# Patient Record
Sex: MALE | Race: WHITE | NOT HISPANIC OR LATINO | Employment: FULL TIME | URBAN - METROPOLITAN AREA
[De-identification: names, ages, dates, MRNs, and addresses within clinical notes are randomized per-mention and may not be internally consistent; named-entity substitution may affect disease eponyms.]

---

## 2017-01-22 ENCOUNTER — HOSPITAL ENCOUNTER (EMERGENCY)
Facility: CLINIC | Age: 57
Discharge: HOME | End: 2017-01-22
Attending: PHYSICIAN ASSISTANT | Admitting: PHYSICIAN ASSISTANT
Payer: COMMERCIAL

## 2017-01-22 ENCOUNTER — HOSPITAL ENCOUNTER (EMERGENCY)
Facility: CLINIC | Age: 57
Discharge: HOME | End: 2017-01-22
Attending: EMERGENCY MEDICINE | Admitting: EMERGENCY MEDICINE
Payer: COMMERCIAL

## 2017-01-22 VITALS
BODY MASS INDEX: 32.14 KG/M2 | TEMPERATURE: 97.9 F | DIASTOLIC BLOOD PRESSURE: 125 MMHG | HEIGHT: 66 IN | SYSTOLIC BLOOD PRESSURE: 197 MMHG | OXYGEN SATURATION: 99 % | WEIGHT: 200 LBS | RESPIRATION RATE: 20 BRPM

## 2017-01-22 VITALS
HEART RATE: 107 BPM | DIASTOLIC BLOOD PRESSURE: 99 MMHG | SYSTOLIC BLOOD PRESSURE: 153 MMHG | RESPIRATION RATE: 18 BRPM | HEIGHT: 66 IN | WEIGHT: 200 LBS | BODY MASS INDEX: 32.14 KG/M2 | TEMPERATURE: 97.5 F | OXYGEN SATURATION: 97 %

## 2017-01-22 DIAGNOSIS — K08.89 PAIN, DENTAL: ICD-10-CM

## 2017-01-22 PROCEDURE — 64400 NJX AA&/STRD TRIGEMINAL NRV: CPT

## 2017-01-22 PROCEDURE — 99283 EMERGENCY DEPT VISIT LOW MDM: CPT | Mod: 25

## 2017-01-22 RX ORDER — AMLODIPINE BESYLATE 5 MG/1
5 TABLET ORAL DAILY
Status: DISCONTINUED | OUTPATIENT
Start: 2017-01-22 | End: 2017-01-22 | Stop reason: HOSPADM

## 2017-01-22 RX ORDER — OXYCODONE HYDROCHLORIDE 5 MG/1
10 TABLET ORAL ONCE
Status: COMPLETED | OUTPATIENT
Start: 2017-01-22 | End: 2017-01-22

## 2017-01-22 RX ORDER — AMOXICILLIN 500 MG/1
500 CAPSULE ORAL 3 TIMES DAILY
Qty: 21 CAPSULE | Refills: 0 | Status: SHIPPED | OUTPATIENT
Start: 2017-01-22 | End: 2017-01-29

## 2017-01-22 RX ORDER — TRAMADOL HYDROCHLORIDE 50 MG/1
50 TABLET ORAL EVERY 6 HOURS PRN
Qty: 20 TABLET | Refills: 0 | Status: SHIPPED | OUTPATIENT
Start: 2017-01-22 | End: 2018-01-27

## 2017-01-22 RX ORDER — NAPROXEN 500 MG/1
500 TABLET ORAL EVERY 12 HOURS PRN
Qty: 15 TABLET | Refills: 0 | Status: SHIPPED | OUTPATIENT
Start: 2017-01-22 | End: 2018-01-27

## 2017-01-22 RX ADMIN — AMLODIPINE BESYLATE 5 MG: 5 TABLET ORAL at 16:28

## 2017-01-22 RX ADMIN — OXYCODONE HYDROCHLORIDE 10 MG: 5 TABLET ORAL at 15:22

## 2017-01-22 ASSESSMENT — ENCOUNTER SYMPTOMS
FACIAL SWELLING: 1
LIGHT-HEADEDNESS: 0
NAUSEA: 0
FEVER: 0
CHILLS: 0
SORE THROAT: 1
SHORTNESS OF BREATH: 0
FEVER: 0

## 2017-01-22 NOTE — ED AVS SNAPSHOT
"  Emergency Department    [address] [facility] [address]    Phone:  [phone]    Fax:  [phone]                                       Fawad Wilkinson   MRN: [id number]    Department:   Emergency Department   Date of Visit:  1/22/2017           Patient Information     Date Of Birth          1960        Your diagnoses for this visit were:     Pain, dental        You were seen by Ephraim French MD.      Follow-up Information     Follow up with Moises Arcos MD.    Specialty:  Family Practice    Contact information:    [facility]  [address] [facility] [address]  [phone]          Follow up with Dentist ASAP.        Discharge Instructions          *DENTAL PAIN    A crack or cavity in the tooth, which exposes the sensitive inner area of the tooth can cause tooth pain. An infection in the gum or the root of the tooth can cause pain and swelling. The pain is often made worse by drinking hot or cold fluids, or biting on hard foods. Pain may spread from the tooth to the ear or jaw on the same side.  HOME CARE:  1. Avoid hot and cold foods and liquids since your tooth may be sensitive to temperature changes.  2. If your tooth is chipped or cracked, or if there is a large open cavity, apply OIL OF CLOVES (available over-the-counter in drug stores) directly to the tooth to reduce pain. Some pharmacies carry an over-the-counter \"toothache kit.\" This contains a paste, which can be applied over the exposed tooth to decrease sensitivity. This is only a temporary solution. See a dentist as soon as possible to fix the tooth.  3. A cold pack on your jaw over the sore area may help reduce pain.  4. You may use acetaminophen (Tylenol) 650-1000 mg every 6 hours or ibuprofen (Motrin, Advil) 600 mg every 6-8 hours with food to control pain, if you are able to take these medicines. [ NOTE: If you have chronic liver or kidney disease or ever had a stomach ulcer or GI " bleeding, talk with your doctor before using these medicines.]  5. If you have signs of an infection, an antibiotic will be given. Take it as directed.  FOLLOW-UP as directed with a dentist. Your pain may go away with the treatment given. However, only a dentist can fully evaluate and treat the cause and prevent the pain from coming back again.  TOOTHACHE IS A SIGN OF DISEASE IN YOUR TOOTH AND SHOULD BE EXAMINED AND TREATED BY A DENTIST.  GET PROMPT MEDICAL ATTENTION if any of the following occur:    Your face becomes swollen or red    Pain worsens or spreads to the neck    Fever over 101  F (38.3  C)    Unusual drowsiness; headache or stiff neck; weakness or fainting    Pus drains from the tooth    Difficulty swallowing or breathing       1385-3148 [facility], [address] [place] [address], [place] [address]. All rights reserved. This information is not intended as a substitute for professional medical care. Always follow your healthcare professional's instructions.      24 Hour Appointment Hotline       To make an appointment at any [facility], call [phone] ([phone]). If you don't have a family doctor or clinic, we will help you find one. [facility] clinics are conveniently located to serve the needs of you and your family.             Review of your medicines      START taking        Dose / Directions Last dose taken    amoxicillin 500 MG capsule   Commonly known as:  AMOXIL   Dose:  500 mg   Quantity:  21 capsule        Take 1 capsule (500 mg) by mouth 3 times daily for 7 days   Refills:  0        naproxen 500 MG tablet   Commonly known as:  NAPROSYN   Dose:  500 mg   Quantity:  15 tablet        Take 1 tablet (500 mg) by mouth every 12 hours as needed for moderate pain   Refills:  0        traMADol 50 MG tablet   Commonly known as:  ULTRAM   Dose:  50 mg   Quantity:  20 tablet        Take 1 tablet (50 mg) by mouth every 6 hours as needed for moderate pain   Refills:  0          Our records show  that you are taking the medicines listed below. If these are incorrect, please call your family doctor or clinic.        Dose / Directions Last dose taken    AMLODIPINE BESYLATE PO   Dose:  5 mg        Take 5 mg by mouth daily.   Refills:  0        NORTRIPTYLINE HCL PO   Dose:  50 mg        Take 50 mg by mouth daily.   Refills:  0                Prescriptions were sent or printed at these locations (3 Prescriptions)                   Other Prescriptions                Printed at Department/Unit printer (3 of 3)         naproxen (NAPROSYN) 500 MG tablet               traMADol (ULTRAM) 50 MG tablet               amoxicillin (AMOXIL) 500 MG capsule                Orders Needing Specimen Collection     None      Pending Results     No orders found from 1/21/2017 to 1/23/2017.            Pending Culture Results     No orders found from 1/21/2017 to 1/23/2017.             Test Results from your hospital stay            Clinical Quality Measure: Blood Pressure Screening     Your blood pressure was checked while you were in the emergency department today. The last reading we obtained was  BP: (!) 197/125 mmHg . Please read the guidelines below about what these numbers mean and what you should do about them.  If your systolic blood pressure (the top number) is less than 120 and your diastolic blood pressure (the bottom number) is less than 80, then your blood pressure is normal. There is nothing more that you need to do about it.  If your systolic blood pressure (the top number) is 120-139 or your diastolic blood pressure (the bottom number) is 80-89, your blood pressure may be higher than it should be. You should have your blood pressure rechecked within a year by a primary care provider.  If your systolic blood pressure (the top number) is 140 or greater or your diastolic blood pressure (the bottom number) is 90 or greater, you may have high blood pressure. High blood pressure is treatable, but if left untreated over time  "it can put you at risk for heart attack, stroke, or kidney failure. You should have your blood pressure rechecked by a primary care provider within the next 4 weeks.  If your provider in the emergency department today gave you specific instructions to follow-up with your doctor or provider even sooner than that, you should follow that instruction and not wait for up to 4 weeks for your follow-up visit.        Thank you for choosing [facility]       Thank you for choosing [facility] for your care. Our goal is always to provide you with excellent care. Hearing back from our patients is one way we can continue to improve our services. Please take a few minutes to complete the written survey that you may receive in the mail after you visit with us. Thank you!        [facility] Information     [employer] lets you send messages to your doctor, view your test results, renew your prescriptions, schedule appointments and more. To sign up, go to [email or web address] . Click on \"Log in\" on the left side of the screen, which will take you to the Welcome page. Then click on \"Sign up Now\" on the right side of the page.     You will be asked to enter the access code listed below, as well as some personal information. Please follow the directions to create your username and password.     Your access code is: [email or web address]  Expires: 2017 12:29 AM     Your access code will  in 90 days. If you need help or a new code, please call your [facility] clinic or [phone].        Care EveryWhere ID     This is your Care EveryWhere ID. This could be used by other organizations to access your [facility] medical records  [id number]        After Visit Summary       This is your record. Keep this with you and show to your community pharmacist(s) and doctor(s) at your next visit.                  "

## 2017-01-22 NOTE — DISCHARGE INSTRUCTIONS
"   *DENTAL PAIN    A crack or cavity in the tooth, which exposes the sensitive inner area of the tooth can cause tooth pain. An infection in the gum or the root of the tooth can cause pain and swelling. The pain is often made worse by drinking hot or cold fluids, or biting on hard foods. Pain may spread from the tooth to the ear or jaw on the same side.  HOME CARE:  1. Avoid hot and cold foods and liquids since your tooth may be sensitive to temperature changes.  2. If your tooth is chipped or cracked, or if there is a large open cavity, apply OIL OF CLOVES (available over-the-counter in drug stores) directly to the tooth to reduce pain. Some pharmacies carry an over-the-counter \"toothache kit.\" This contains a paste, which can be applied over the exposed tooth to decrease sensitivity. This is only a temporary solution. See a dentist as soon as possible to fix the tooth.  3. A cold pack on your jaw over the sore area may help reduce pain.  4. You may use acetaminophen (Tylenol) 650-1000 mg every 6 hours or ibuprofen (Motrin, Advil) 600 mg every 6-8 hours with food to control pain, if you are able to take these medicines. [ NOTE: If you have chronic liver or kidney disease or ever had a stomach ulcer or GI bleeding, talk with your doctor before using these medicines.]  5. If you have signs of an infection, an antibiotic will be given. Take it as directed.  FOLLOW-UP as directed with a dentist. Your pain may go away with the treatment given. However, only a dentist can fully evaluate and treat the cause and prevent the pain from coming back again.  TOOTHACHE IS A SIGN OF DISEASE IN YOUR TOOTH AND SHOULD BE EXAMINED AND TREATED BY A DENTIST.  GET PROMPT MEDICAL ATTENTION if any of the following occur:    Your face becomes swollen or red    Pain worsens or spreads to the neck    Fever over 101  F (38.3  C)    Unusual drowsiness; headache or stiff neck; weakness or fainting    Pus drains from the tooth    Difficulty " swallowing or breathing       [id number] [facility], [address] [place] [address], [place] [address]. All rights reserved. This information is not intended as a substitute for professional medical care. Always follow your healthcare professional's instructions.

## 2017-01-22 NOTE — DISCHARGE INSTRUCTIONS
Discharge Instructions  Dental Pain    You have been seen today for a toothache. Your pain may be caused by an exposed nerve, an infection (pulpitis), a root abscess, or other problems. You will need to see a dentist for a solution to your tooth problem. Emergency Department care is only to help control your problem until you can see a dentist.  Today, we did not find any sign that your toothache was caused by a serious condition, but sometimes symptoms develop over time and cannot be found during an emergency visit, so it is very important that you follow up with your dentist.      Return to the Emergency Department if:    You develop a fever over 101 degrees Fahrenheit.    You can t open your mouth normally, can t move your tongue well, or can t swallow.    You have new or increased swelling of your face or neck.    You develop drainage of pus or foul smelling material from around your tooth.  What can I do to help myself?    Take any antibiotic the doctor may have prescribed for you today.    Avoid very hot or very cold foods as both can cause pain.    Make an appointment to see a dentist as soon as possible. If you wish, we can provide you with a list of low-cost dental clinics.   If you were given a prescription for medicine here today, be sure to read all of the information (including the package insert) that comes with your prescription.  This will include important information about the medicine, its side effects, and any warnings that you need to know about.  The pharmacist who fills the prescription can provide more information and answer questions you may have about the medicine.  If you have questions or concerns that the pharmacist cannot address, please call or return to the Emergency Department.   Opioid Medication Information    Pain medications are among the most commonly prescribed medicines, so we are including this information for all our patients. If you did not receive pain medication or get  a prescription for pain medicine, you can ignore it.     You may have been given a prescription for an opioid (narcotic) pain medicine and/or have received a pain medicine while here in the Emergency Department. These medicines can make you drowsy or impaired. You must not drive, operate dangerous equipment, or engage in any other dangerous activities while taking these medications. If you drive while taking these medications, you could be arrested for DUI, or driving under the influence. Do not drink any alcohol while you are taking these medications.     Opioid pain medications can cause addiction. If you have a history of chemical dependency of any type, you are at a higher risk of becoming addicted to pain medications.  Only take these prescribed medications to treat your pain when all other options have been tried. Take it for as short a time and as few doses as possible. Store your pain pills in a secure place, as they are frequently stolen and provide a dangerous opportunity for children or visitors in your house to start abusing these powerful medications. We will not replace any lost or stolen medicine.  As soon as your pain is better, you should flush all your remaining medication.     Many prescription pain medications contain Tylenol  (acetaminophen), including Vicodin , Tylenol #3 , Norco , Lortab , and Percocet .  You should not take any extra pills of Tylenol  if you are using these prescription medications or you can get very sick.  Do not ever take more than 3000 mg of acetaminophen in any 24 hour period.    All opioids tend to cause constipation. Drink plenty of water and eat foods that have a lot of fiber, such as fruits, vegetables, prune juice, apple juice and high fiber cereal.  Take a laxative if you don t move your bowels at least every other day. Miralax , Milk of Magnesia, Colace , or Senna  can be used to keep you regular.      Remember that you can always come back to the Emergency  Department if you are not able to see your regular doctor in the amount of time listed above, if you get any new symptoms, or if there is anything that worries you.

## 2017-01-22 NOTE — ED PROVIDER NOTES
"  History     Chief Complaint:  Dental Pain    HPI   Fawad Wilkinson is a 56 year old male who presents to the emergency department today for evaluation of dental pain. The patient reports that one of his lower left teeth is cracked, and he has been experiencing worsening pain over the past day. He has a dental appointment scheduled for next week. Denies fevers, or pus or drainage.     Allergies:  The patient has no known drug allergies.     Medications:    Nortriptyline  Amlodipine     Past Medical History:    History reviewed.  No significant past medical history.    Past Surgical History:    History reviewed.  No significant past surgical history.     Family History:    History reviewed.  No significant family history.    Social History:  Tobacco use: Positive (0.5 ppd)  Alcohol use: Negative  The patient presents alone.     Review of Systems   Constitutional: Negative for fever.   HENT: Positive for dental problem.    All other systems reviewed and are negative.      Physical Exam   Vitals:  BP: 197/125 mmHg  Temp: 97.9  F (36.6  C)  Temp src: Oral  Resp: 20  SpO2: 99 %  Height: 167.6 cm (5' 6\")  Weight: 90.719 kg (200 lb)     Physical Exam  Constitutional: Well-developed and well-nourished. Alert and non-toxic appearing.  HENT: Poor dentition. Left sided broken lower first premolar, mild gingival inflammation, tender with palpation, no drainage.   Head: No evidence of craniofacial trauma.  Mouth/Throat: Oropharynx is clear and moist.  Ears: External ears normal.  Eyes:  Conjunctivae normal. EOM grossly normal. No discharge.  Neck: Normal range of motion. Neck supple.       Emergency Department Course     Emergency Department Course:  Nursing notes and vitals reviewed.  I performed an exam of the patient as documented above.  The above workup was undertaken.  1220: I performed a nerve block near the injured tooth, using 0.5% bupivacaine with epinephrine.     Findings and plan explained to the Patient. Patient " discharged home with instructions regarding supportive care, medications, and reasons to return. The importance of close follow-up was reviewed. The patient was prescribed Naprosyn, Ultram, and Amoxil.        Impression & Plan      Medical Decision Making:  Fawad Wilkinson is a 56 year old male with a one day history of worsening tooth pain on the left side. He notes that he broke the tooth some months ago. On exam, he does have gingival inflammation, but no fluctuance or drainage that would suggest chris abscess. I feel he can be safely managed as an outpatient. I performed inferior alveolar nerve block with good relief. I have prescribed naproxen, and a small prescription for tramadol. He should follow up with the dentist this week. I will place him on Amoxil for the next seven days.     Diagnosis:    ICD-10-CM   1. Pain, dental K08.89     Disposition:   Discharge to home with dental follow up.     Discharge Medications:  naproxen (NAPROSYN) 500 MG tablet Take 1 tablet (500 mg) by mouth every 12 hours as needed for moderate pain, Disp-15 tablet, R-0, Local Print   tramadol (ULTRAM) 50 MG tablet Take 1 tablet (50 mg) by mouth every 6 hours as needed for moderate pain, Disp-20 tablet, R-0, Local Print   amoxicillin (AMOXIL) 500 MG capsule Take 1 capsule (500 mg) by mouth 3 times daily for 7 days, Disp-21 capsule, R-0, Local Print     Rbo RITTER, sherif serving as a scribe on 1/22/2017 at 12:10 AM to personally document services performed by Ephraim French MD, based on my observations and the provider's statements to me.     EMERGENCY DEPARTMENT        Ephraim French MD  01/22/17 0436

## 2017-01-22 NOTE — ED AVS SNAPSHOT
Emergency Department    [address] [place] [address]    Phone:  [phone]    Fax:  [phone]                                       Fawad Wilkinson   MRN: [id number]    Department:   Emergency Department   Date of Visit:  1/22/2017           After Visit Summary Signature Page     I have received my discharge instructions, and my questions have been answered. I have discussed any challenges I see with this plan with the nurse or doctor.    ..........................................................................................................................................  Patient/Patient Representative Signature      ..........................................................................................................................................  Patient Representative Print Name and Relationship to Patient    ..................................................               ................................................  Date                                            Time    ..........................................................................................................................................  Reviewed by Signature/Title    ...................................................              ..............................................  Date                                                            Time

## 2017-01-22 NOTE — ED PROVIDER NOTES
History     Chief Complaint:  Dental Pain      HPI   Fawad Wilkinson is a 56 year old male with a history of HTN and DM who presents with dental pain.  The patient reports developing pain in his left lower jaw yesterday morning in the area where he has a broken tooth.  He was seen for this same pain earlier today and was provided a nerve block which did provide him temporary relief.  He was also provided prescriptions for Amoxicillin, Tramadol, and Naproxen.  He woke this afternoon with the same pain in his left lower jaw.  He was unable to find a dentist today but reports he does have an appointment scheduled for tomorrow.  He has taken 3 doses of each of the medications he was prescribed with no improvement in his pain.  He feels his left face is more swollen and he does endorse a slight sore throat.  He denies any fevers, chills, or drainage from the area.    Patient was noted to be hypertensive on arrival.  He did not take his Amlodipine this morning.  He denies any chest pain, shortness of breath, nausea, or lightheadedness.  He denies any significant alcohol use or illicit drug use.    Allergies:  No known drug allergies.      Medications:    Naproxen  Tramadol  Amoxicillin  Amlodipine  Nortriptyline       Past Medical History:    Hypertension  Diabetes      Past Surgical History:    History reviewed. No pertinent past surgical history.      Family History:    History reviewed. No pertinent family history.      Social History:  Marital Status: single  Presents to the ED alone.  Tobacco Use: Current daily smoker, 0.5 PPD.   Alcohol Use: No alcohol use.   Drug Use: History of methamphetamine use     Review of Systems   Constitutional: Negative for fever and chills.   HENT: Positive for dental problem, facial swelling and sore throat.    Respiratory: Negative for shortness of breath.    Cardiovascular: Negative for chest pain.   Gastrointestinal: Negative for nausea.   Neurological: Negative for  "light-headedness.   All other systems reviewed and are negative.      Physical Exam   First Vitals:  BP: (!) 150/104 mmHg  Pulse: 107  Temp: 97.5  F (36.4  C)  Resp: 18  Height: 167.6 cm (5' 6\")  Weight: 90.719 kg (200 lb)  SpO2: 100 %      Physical Exam  General: Alert, interactive, appears comfortable sitting in the bed.     Eye:  Pupils are equal, round, and reactive. Sclera are slightly injected.    ENT:     No rhinorrhea. Moist mucus membranes.  Oropharynx is clear with no exudates.   Uvula midline. No peritonsillar edema. Tooth #21 is broken and tender with some gingivitis. No palpable abscess. No sublingual edema. No mandibular, submandibular, or neck tenderness. Mild left facial swelling. There is erythema over bilateral lower cheeks which appears chronic; no increased warmth. No trismus.     Neck: No rigidity. Trachea is  Midline. No palpable mass or lymphadenopathy is detected              Cardiac:  Slightly tachycardic rate and rhythm. S1, S2.  No murmurs, gallops, or rubs.    Pulmonary:  Clear to auscultation bilaterally.  No wheezes or crackles.             Non labored. No use of accessory muscles.     Musculoskeletal:  Freely moveable extremities    Skin:  Warm and mildly diaphoretic.    Neurologic:  Non-focal exam without asymmetric weakness or numbness.  GCS 15    Psychiatric:  Awake. Appropriate interaction with examiner.      Emergency Department Course     Procedures:    Dental Block      INDICATIONS: Dental pain    ANESTHESIA: Left inferior alveolar nerve block using Bupivacaine 0.5% without Epinephrine.     PATIENT STATUS: The patient tolerated the procedure well and there were no immediate complications.        Interventions:  (1512) Oxycodone, 10 mg, PO  (1628) Amlodipine, 5 mg, PO    Emergency Department Course:  Nursing notes and vitals reviewed.  I performed an exam of the patient as documented above.     I performed a dental block as documented above.     (1545) Recheck: Pain is improving. "      (1600) Recheck: Patient feels improved.  Repeat BP: 133/96, HR 97    Findings and plan explained to the patient. Patient discharged home with instructions regarding supportive care, medications, and reasons to return. The importance of close follow-up was reviewed.     Impression & Plan      Medical Decision Making:  This is a 56 year old male with a history of hypertension and substance abuse here with complaints of dental pain.  On exam the patient is hypertensive and tachycardic.  He denies any chest pain, shortness of breath, nausea after multiple rounds of questioning throughout the emergency department.  He does report left lower molar dental pain which is tender to palpation at tooth #21.  He does have surrounding gingival inflammation and has significant dental bill.  He was seen and evaluated earlier today in the emergency department for similar presentation and prescribed Amoxicillin, Tramadol, and Naproxen.  He also received a dental block which apparently worn off and the patient represented for pain this afternoon.  He continued to deny any chest pain, shortness of breath, or other symptoms.  With the resolution of his pain with the dental block and his clinical exam, I doubt further underlying referred etiology.  There is no clinical evidence suggestive of deep space infection or osteomyelitis of the mandibular region.  There is no clinical evidence suggestive of retropharyngeal abscess or peritonsilar abscess.  Per chart review the patient has it noted that he has a history of substance abuse however he declines this at this time, stating it was many years ago.  He denies any current recreational drug use.  The prescription database monitoring shows recent prescriptions, including the one from earlier today.  He also denies alcohol use. After his pain was controlled the pts blood pressure and diaphoresis became better controlled. He did not take his BP medication this morning so we franco given him  his does of amlodipine 5mg PO.   The pt is discharged home with follow up for his dentist appointment tomorrow.     Diagnosis:    ICD-10-CM    1. Pain, dental K08.89    2. Elevated blood pressure I10        Disposition:  Discharged to home.     Discharge Medications:  None      Luli RITTER, am serving as a scribe on 1/22/2017 at 2:33 PM to personally document services performed by Herminia Cox PA-C based on my observations and the provider's statements to me.     Luli Carney  1/22/2017    EMERGENCY DEPARTMENT        Herminia Cox PA-C  01/22/17 [id number]

## 2017-01-22 NOTE — ED AVS SNAPSHOT
Emergency Department    [address] [place] [address]    Phone:  [phone]    Fax:  [phone]                                       Fawad Wilkinson   MRN: [id number]    Department:   Emergency Department   Date of Visit:  1/22/2017           Patient Information     Date Of Birth          1960        Your diagnoses for this visit were:     Pain, dental     Elevated blood pressure        You were seen by Herminia Cox PA-C.      Follow-up Information     Follow up with The dentist . Go in 1 day.    Why:  To your scheduled appointment         Discharge Instructions         Discharge Instructions  Dental Pain    You have been seen today for a toothache. Your pain may be caused by an exposed nerve, an infection (pulpitis), a root abscess, or other problems. You will need to see a dentist for a solution to your tooth problem. Emergency Department care is only to help control your problem until you can see a dentist.  Today, we did not find any sign that your toothache was caused by a serious condition, but sometimes symptoms develop over time and cannot be found during an emergency visit, so it is very important that you follow up with your dentist.      Return to the Emergency Department if:    You develop a fever over 101 degrees Fahrenheit.    You can t open your mouth normally, can t move your tongue well, or can t swallow.    You have new or increased swelling of your face or neck.    You develop drainage of pus or foul smelling material from around your tooth.  What can I do to help myself?    Take any antibiotic the doctor may have prescribed for you today.    Avoid very hot or very cold foods as both can cause pain.    Make an appointment to see a dentist as soon as possible. If you wish, we can provide you with a list of low-cost dental clinics.   If you were given a prescription for medicine here today, be sure to read all of the information (including the package  insert) that comes with your prescription.  This will include important information about the medicine, its side effects, and any warnings that you need to know about.  The pharmacist who fills the prescription can provide more information and answer questions you may have about the medicine.  If you have questions or concerns that the pharmacist cannot address, please call or return to the Emergency Department.   Opioid Medication Information    Pain medications are among the most commonly prescribed medicines, so we are including this information for all our patients. If you did not receive pain medication or get a prescription for pain medicine, you can ignore it.     You may have been given a prescription for an opioid (narcotic) pain medicine and/or have received a pain medicine while here in the Emergency Department. These medicines can make you drowsy or impaired. You must not drive, operate dangerous equipment, or engage in any other dangerous activities while taking these medications. If you drive while taking these medications, you could be arrested for DUI, or driving under the influence. Do not drink any alcohol while you are taking these medications.     Opioid pain medications can cause addiction. If you have a history of chemical dependency of any type, you are at a higher risk of becoming addicted to pain medications.  Only take these prescribed medications to treat your pain when all other options have been tried. Take it for as short a time and as few doses as possible. Store your pain pills in a secure place, as they are frequently stolen and provide a dangerous opportunity for children or visitors in your house to start abusing these powerful medications. We will not replace any lost or stolen medicine.  As soon as your pain is better, you should flush all your remaining medication.     Many prescription pain medications contain Tylenol  (acetaminophen), including Vicodin , Tylenol #3 , Norco ,  Lortab , and Percocet .  You should not take any extra pills of Tylenol  if you are using these prescription medications or you can get very sick.  Do not ever take more than 3000 mg of acetaminophen in any 24 hour period.    All opioids tend to cause constipation. Drink plenty of water and eat foods that have a lot of fiber, such as fruits, vegetables, prune juice, apple juice and high fiber cereal.  Take a laxative if you don t move your bowels at least every other day. Miralax , Milk of Magnesia, Colace , or Senna  can be used to keep you regular.      Remember that you can always come back to the Emergency Department if you are not able to see your regular doctor in the amount of time listed above, if you get any new symptoms, or if there is anything that worries you.      24 Hour Appointment Hotline       To make an appointment at any [facility], call [phone] ([phone]). If you don't have a family doctor or clinic, we will help you find one. Home clinics are conveniently located to serve the needs of you and your family.             Review of your medicines      Our records show that you are taking the medicines listed below. If these are incorrect, please call your family doctor or clinic.        Dose / Directions Last dose taken    AMLODIPINE BESYLATE PO   Dose:  5 mg        Take 5 mg by mouth daily.   Refills:  0        amoxicillin 500 MG capsule   Commonly known as:  AMOXIL   Dose:  500 mg   Quantity:  21 capsule        Take 1 capsule (500 mg) by mouth 3 times daily for 7 days   Refills:  0        naproxen 500 MG tablet   Commonly known as:  NAPROSYN   Dose:  500 mg   Quantity:  15 tablet        Take 1 tablet (500 mg) by mouth every 12 hours as needed for moderate pain   Refills:  0        NORTRIPTYLINE HCL PO   Dose:  50 mg        Take 50 mg by mouth daily.   Refills:  0        traMADol 50 MG tablet   Commonly known as:  ULTRAM   Dose:  50 mg   Quantity:  20 tablet        Take 1  tablet (50 mg) by mouth every 6 hours as needed for moderate pain   Refills:  0                Orders Needing Specimen Collection     None      Pending Results     No orders found from 1/21/2017 to 1/23/2017.            Pending Culture Results     No orders found from 1/21/2017 to 1/23/2017.             Test Results from your hospital stay            Clinical Quality Measure: Blood Pressure Screening     Your blood pressure was checked while you were in the emergency department today. The last reading we obtained was  BP: (!) 153/99 mmHg . Please read the guidelines below about what these numbers mean and what you should do about them.  If your systolic blood pressure (the top number) is less than 120 and your diastolic blood pressure (the bottom number) is less than 80, then your blood pressure is normal. There is nothing more that you need to do about it.  If your systolic blood pressure (the top number) is 120-139 or your diastolic blood pressure (the bottom number) is 80-89, your blood pressure may be higher than it should be. You should have your blood pressure rechecked within a year by a primary care provider.  If your systolic blood pressure (the top number) is 140 or greater or your diastolic blood pressure (the bottom number) is 90 or greater, you may have high blood pressure. High blood pressure is treatable, but if left untreated over time it can put you at risk for heart attack, stroke, or kidney failure. You should have your blood pressure rechecked by a primary care provider within the next 4 weeks.  If your provider in the emergency department today gave you specific instructions to follow-up with your doctor or provider even sooner than that, you should follow that instruction and not wait for up to 4 weeks for your follow-up visit.        Thank you for choosing [facility]       Thank you for choosing [facility] for your care. Our goal is always to provide you with excellent care. Hearing back from our  "patients is one way we can continue to improve our services. Please take a few minutes to complete the written survey that you may receive in the mail after you visit with us. Thank you!        [employer] Information     [employer] lets you send messages to your doctor, view your test results, renew your prescriptions, schedule appointments and more. To sign up, go to [email or web address] . Click on \"Log in\" on the left side of the screen, which will take you to the Welcome page. Then click on \"Sign up Now\" on the right side of the page.     You will be asked to enter the access code listed below, as well as some personal information. Please follow the directions to create your username and password.     Your access code is: [email or web address]  Expires: 2017 12:29 AM     Your access code will  in 90 days. If you need help or a new code, please call your [facility] clinic or [phone].        Care EveryWhere ID     This is your Care EveryWhere ID. This could be used by other organizations to access your [facility] medical records  [id number]        After Visit Summary       This is your record. Keep this with you and show to your community pharmacist(s) and doctor(s) at your next visit.                  "

## 2017-01-22 NOTE — ED AVS SNAPSHOT
Emergency Department    [address] [place] [address]    Phone:  [phone]    Fax:  [phone]                                       Fawad Wilkinson   MRN: [id number]    Department:   Emergency Department   Date of Visit:  1/22/2017           After Visit Summary Signature Page     I have received my discharge instructions, and my questions have been answered. I have discussed any challenges I see with this plan with the nurse or doctor.    ..........................................................................................................................................  Patient/Patient Representative Signature      ..........................................................................................................................................  Patient Representative Print Name and Relationship to Patient    ..................................................               ................................................  Date                                            Time    ..........................................................................................................................................  Reviewed by Signature/Title    ...................................................              ..............................................  Date                                                            Time

## 2018-01-27 ENCOUNTER — APPOINTMENT (OUTPATIENT)
Dept: GENERAL RADIOLOGY | Facility: CLINIC | Age: 58
End: 2018-01-27
Attending: EMERGENCY MEDICINE
Payer: COMMERCIAL

## 2018-01-27 ENCOUNTER — TRANSFERRED RECORDS (OUTPATIENT)
Dept: HEALTH INFORMATION MANAGEMENT | Facility: CLINIC | Age: 58
End: 2018-01-27

## 2018-01-27 ENCOUNTER — HOSPITAL ENCOUNTER (OUTPATIENT)
Facility: CLINIC | Age: 58
Setting detail: OBSERVATION
Discharge: HOME | End: 2018-01-28
Attending: EMERGENCY MEDICINE | Admitting: INTERNAL MEDICINE
Payer: COMMERCIAL

## 2018-01-27 DIAGNOSIS — J10.1 INFLUENZA A: ICD-10-CM

## 2018-01-27 LAB
GLUCOSE BLDC GLUCOMTR-MCNC: 112 MG/DL (ref 70–99)
GLUCOSE BLDC GLUCOMTR-MCNC: 77 MG/DL (ref 70–99)
INTERPRETATION ECG - MUSE: NORMAL
TROPONIN I SERPL-MCNC: <0.015 UG/L (ref 0–0.04)

## 2018-01-27 PROCEDURE — 00000146 ZZHCL STATISTIC GLUCOSE BY METER IP

## 2018-01-27 PROCEDURE — 96361 HYDRATE IV INFUSION ADD-ON: CPT

## 2018-01-27 PROCEDURE — 25000131 ZZH RX MED GY IP 250 OP 636 PS 637: Performed by: INTERNAL MEDICINE

## 2018-01-27 PROCEDURE — 99285 EMERGENCY DEPT VISIT HI MDM: CPT | Mod: 25

## 2018-01-27 PROCEDURE — 25000128 H RX IP 250 OP 636: Performed by: INTERNAL MEDICINE

## 2018-01-27 PROCEDURE — 71046 X-RAY EXAM CHEST 2 VIEWS: CPT

## 2018-01-27 PROCEDURE — 84484 ASSAY OF TROPONIN QUANT: CPT | Performed by: EMERGENCY MEDICINE

## 2018-01-27 PROCEDURE — 93005 ELECTROCARDIOGRAM TRACING: CPT

## 2018-01-27 PROCEDURE — 25000128 H RX IP 250 OP 636: Performed by: EMERGENCY MEDICINE

## 2018-01-27 PROCEDURE — 25000132 ZZH RX MED GY IP 250 OP 250 PS 637: Performed by: INTERNAL MEDICINE

## 2018-01-27 PROCEDURE — 96374 THER/PROPH/DIAG INJ IV PUSH: CPT

## 2018-01-27 PROCEDURE — G0378 HOSPITAL OBSERVATION PER HR: HCPCS

## 2018-01-27 PROCEDURE — 99220 ZZC INITIAL OBSERVATION CARE,LEVL III: CPT | Performed by: INTERNAL MEDICINE

## 2018-01-27 PROCEDURE — 25000132 ZZH RX MED GY IP 250 OP 250 PS 637: Performed by: EMERGENCY MEDICINE

## 2018-01-27 RX ORDER — OSELTAMIVIR PHOSPHATE 75 MG/1
75 CAPSULE ORAL 2 TIMES DAILY
Status: DISCONTINUED | OUTPATIENT
Start: 2018-01-27 | End: 2018-01-28 | Stop reason: HOSPADM

## 2018-01-27 RX ORDER — NICOTINE POLACRILEX 4 MG
15-30 LOZENGE BUCCAL
Status: DISCONTINUED | OUTPATIENT
Start: 2018-01-27 | End: 2018-01-28 | Stop reason: HOSPADM

## 2018-01-27 RX ORDER — KETOROLAC TROMETHAMINE 30 MG/ML
30 INJECTION, SOLUTION INTRAMUSCULAR; INTRAVENOUS ONCE
Status: COMPLETED | OUTPATIENT
Start: 2018-01-27 | End: 2018-01-27

## 2018-01-27 RX ORDER — ACETAMINOPHEN 650 MG/1
650 SUPPOSITORY RECTAL EVERY 4 HOURS PRN
Status: DISCONTINUED | OUTPATIENT
Start: 2018-01-27 | End: 2018-01-28 | Stop reason: HOSPADM

## 2018-01-27 RX ORDER — ONDANSETRON 2 MG/ML
4 INJECTION INTRAMUSCULAR; INTRAVENOUS EVERY 6 HOURS PRN
Status: DISCONTINUED | OUTPATIENT
Start: 2018-01-27 | End: 2018-01-28 | Stop reason: HOSPADM

## 2018-01-27 RX ORDER — METFORMIN HCL 500 MG
2000 TABLET, EXTENDED RELEASE 24 HR ORAL
Status: ON HOLD | COMMUNITY
End: 2018-01-28

## 2018-01-27 RX ORDER — ONDANSETRON 4 MG/1
4 TABLET, ORALLY DISINTEGRATING ORAL EVERY 6 HOURS PRN
Status: DISCONTINUED | OUTPATIENT
Start: 2018-01-27 | End: 2018-01-28 | Stop reason: HOSPADM

## 2018-01-27 RX ORDER — SODIUM CHLORIDE 9 MG/ML
INJECTION, SOLUTION INTRAVENOUS CONTINUOUS
Status: DISCONTINUED | OUTPATIENT
Start: 2018-01-27 | End: 2018-01-28 | Stop reason: HOSPADM

## 2018-01-27 RX ORDER — NALOXONE HYDROCHLORIDE 0.4 MG/ML
.1-.4 INJECTION, SOLUTION INTRAMUSCULAR; INTRAVENOUS; SUBCUTANEOUS
Status: DISCONTINUED | OUTPATIENT
Start: 2018-01-27 | End: 2018-01-28 | Stop reason: HOSPADM

## 2018-01-27 RX ORDER — INSULIN GLARGINE 100 [IU]/ML
14 INJECTION, SOLUTION SUBCUTANEOUS EVERY EVENING
Status: ON HOLD | COMMUNITY
End: 2018-01-28

## 2018-01-27 RX ORDER — OSELTAMIVIR PHOSPHATE 75 MG/1
75 CAPSULE ORAL ONCE
Status: COMPLETED | OUTPATIENT
Start: 2018-01-27 | End: 2018-01-27

## 2018-01-27 RX ORDER — DEXTROSE MONOHYDRATE 25 G/50ML
25-50 INJECTION, SOLUTION INTRAVENOUS
Status: DISCONTINUED | OUTPATIENT
Start: 2018-01-27 | End: 2018-01-28 | Stop reason: HOSPADM

## 2018-01-27 RX ORDER — POLYETHYLENE GLYCOL 3350 17 G/17G
17 POWDER, FOR SOLUTION ORAL DAILY PRN
Status: DISCONTINUED | OUTPATIENT
Start: 2018-01-27 | End: 2018-01-28 | Stop reason: HOSPADM

## 2018-01-27 RX ORDER — ACETAMINOPHEN 325 MG/1
650 TABLET ORAL EVERY 4 HOURS PRN
Status: DISCONTINUED | OUTPATIENT
Start: 2018-01-27 | End: 2018-01-28 | Stop reason: HOSPADM

## 2018-01-27 RX ADMIN — SODIUM CHLORIDE, POTASSIUM CHLORIDE, SODIUM LACTATE AND CALCIUM CHLORIDE 1000 ML: 600; 310; 30; 20 INJECTION, SOLUTION INTRAVENOUS at 17:03

## 2018-01-27 RX ADMIN — ACETAMINOPHEN 650 MG: 325 TABLET, FILM COATED ORAL at 22:26

## 2018-01-27 RX ADMIN — SODIUM CHLORIDE: 9 INJECTION, SOLUTION INTRAVENOUS at 19:38

## 2018-01-27 RX ADMIN — INSULIN GLARGINE 10 UNITS: 100 INJECTION, SOLUTION SUBCUTANEOUS at 22:26

## 2018-01-27 RX ADMIN — KETOROLAC TROMETHAMINE 30 MG: 30 INJECTION, SOLUTION INTRAMUSCULAR at 17:03

## 2018-01-27 RX ADMIN — OSELTAMIVIR PHOSPHATE 75 MG: 75 CAPSULE ORAL at 18:35

## 2018-01-27 ASSESSMENT — ENCOUNTER SYMPTOMS
HEADACHES: 1
APPETITE CHANGE: 1
FATIGUE: 1
COUGH: 1
MYALGIAS: 1
VOMITING: 1
WEAKNESS: 1
DIARRHEA: 0
SHORTNESS OF BREATH: 1

## 2018-01-27 NOTE — ED PROVIDER NOTES
"  History     Chief Complaint:  Cough and Fever    HPI   Fawad Wilkinson is a smoker 57 year old male with a history of HTN and diabetes mellitus who presents with cough and fever. The patient reports that he began feeling tired, weak, and was experiencing body aches, headache, a productive non bloody cough,  and had several episodes of vomiting. He also reports loss of appetite, slight difficulty breathing, but denies diarrhea and chest pain, prompting his visit to clinic at [facility] (see results below), where he was then referred to the emergency department. Of note, he has had a known ill exposure with his father. He has not taken any medication for his symptoms today.     Results from [facility] 01/28/18:  CBC: WBC: 9.8, HGB: 13.8, PLT: 126 (L)  BMP: Glucose 103 (H), Calcium: 1.14 (L), BUN: 23 (H), o/w WNL (Creatinine: 1.10)    Allergies:  No Known Allergies     Medications:    Naproxen  Tramadol  Nortriptyline  Amlodipine Besylate    Past Medical History:    Diabetes mellitus  Acute kidney injury  HTN    Past Surgical History:    The patient does not have any pertinent past surgical history.    Family History:    No past pertinent family history.    Social History:  The patient is a current every day smoker at 0.50 PPD.   Marital Status:  Single [1]    Review of Systems   Constitutional: Positive for appetite change and fatigue.   Respiratory: Positive for cough and shortness of breath.    Cardiovascular: Negative for chest pain.   Gastrointestinal: Positive for vomiting. Negative for diarrhea.   Musculoskeletal: Positive for myalgias.   Neurological: Positive for weakness and headaches.   All other systems reviewed and are negative.    Physical Exam   First Vitals:  BP: 127/72  Heart Rate: 53  Temp: 98.4  F (36.9  C)  Resp: 20  Height: 170.2 cm (5' 7\")  Weight: 90.7 kg (200 lb)  SpO2: 97 %    Physical Exam   Gen:  Ill-appearing, lying on his side.    Eye:   Pupils are equal, round, and reactive.  "    Sclera non-injected.    ENT:   Moist mucus membranes.     Normal tongue.    Oropharynx without lesions.    Cardiac:     Irregular, bradycardic.    No murmurs, gallops, or rubs.    Pulmonary:     Clear to auscultation bilaterally.    No wheezes, rales, or rhonchi.    Abdomen:     Normal active bowel sounds.     Abdomen is soft and non-distended, without focal tenderness.    Musculoskeletal:     Normal movement of all extremities without evidence for deficit.    Extremities:    No edema.    Skin:   Sweaty, warm.    Neurologic:    Non-focal exam without asymmetric weakness or numbness.    Normal tone    Psychiatric:     Normal affect with appropriate interaction with examiner.    Emergency Department Course   ECG:  Indication: Cough and Fever  Time: 1656  Vent. Rate 105 bpm. MI interval 170 ms. QRS duration 84 ms. QT/QTc 344/454 ms. P-R-T axis 39 21 19. Sinus tachycardia with 2nd degree AV block (Mobitz II) with frequent premature ventricular complexes.Sinus rhythm without PAC's. Abnormal ECG. No significant change compared to EKG date 07/06/13. Read time: 1657    Laboratory:  1805 Troponin I: <0.015    Imaging:  IMPRESSION: No evidence of acute cardiopulmonary disease is seen.  Lungs are mildly hypoaerated.    Interventions:  1703 LR, 1000 mL, IV   Toradol, 30 mg, IV  1835 Tamiflu, 75 mg, PO     Emergency Department Course:  Nursing notes and vitals reviewed. 1645 I performed an exam of the patient as documented above.     IV inserted. Medicine administered as documented above. Blood drawn. This was sent to the lab for further testing, results above.    EKG obtained in the ED, see results above.     1736 I consulted with Dr. Gutierrez, cardiologist, regarding the patient's EKG.  He does not suspect Mobitz II, but thinks it is consistent with sinus rhythm and PAC's.    1809  I consulted with Dr. Morton of the hospitalist services. He is in agreement to accept the patient for admission.      Findings and plan explained to  the Patient who consents to admission. Discussed the patient with Dr. Morton, who will admit the patient to an isolation bed for further monitoring, evaluation, and treatment.      Impression & Plan      Medical Decision Making:  This is a 57yoM with diabetes who presents from urgent care with influenza A.  The patient is ill appearing, but without hypoxia or fever.  Labs were obtained from urgent care, and therefore were not repeated.  He is very weak, and unable to care for himself at home.  He has been unable to eat or drink at home because of his illness.  He was started on tamiflu and resuscitated with fluids.  He will be under observation tonight for supportive care.     Diagnosis:    ICD-10-CM    1. Influenza A J10.1 Troponin I       Disposition:  Admitted to Dr. Morton, hospitalist.     I, Kina Angelo, am serving as a scribe on 1/27/2018 at 4:40 PM to personally document services performed by Clarisse Martinez MD based on my observations and the provider's statements to me.       Kina Angelo  1/27/2018    EMERGENCY DEPARTMENT       Clarisse Martinez MD  01/27/18 [id number]

## 2018-01-27 NOTE — IP AVS SNAPSHOT
MRN:[id number]                      After Visit Summary   1/27/2018    Fawad Wilkinson    MRN: [id number]           Thank you!     Thank you for choosing [facility] for your care. Our goal is always to provide you with excellent care. Hearing back from our patients is one way we can continue to improve our services. Please take a few minutes to complete the written survey that you may receive in the mail after you visit with us. Thank you!        Patient Information     Date Of Birth          1960        About your hospital stay     You were admitted on:  January 27, 2018 [facility]    You were discharged on:  January 28, 2018        Reason for your hospital stay       You were admitted for evaluation of cough and weakness. You were found to have influenza A. You were started on Tamiflu which may help shorten the duration of symptoms but you will still likely feel weak and unwell for several days. Take a reduce dose of Insulin and hold your Metformin until your appetite is back to baseline. Follow up with your regular MD in 7-10 days for a recheck.                  Who to Call     For medical emergencies, please call 911.  For non-urgent questions about your medical care, please call your primary care provider or clinic, [phone]          Attending Provider     Provider Specialty    Clarisse Martinez MD Emergency Medicine    Talat Morton MD Internal Medicine       Primary Care Provider Office Phone # Fax #    Keith Lorenzo [phone]      After Care Instructions     Activity       Your activity upon discharge: activity as tolerated            Diet       Follow this diet upon discharge: Orders Placed This Encounter      Regular Diet Adult                  Follow-up Appointments     Follow-up and recommended labs and tests        Follow up with primary care provider, Keith Lorenzo, within 7 days to evaluate  "treatment change and for hospital follow- up.  The following labs/tests are recommended: CBC.                  Pending Results     No orders found for last 3 day(s).            Statement of Approval     Ordered          18 0956  I have reviewed and agree with all the recommendations and orders detailed in this document.  EFFECTIVE NOW     Approved and electronically signed by:  Rani Echeverria PA-C             Admission Information     Date & Time Provider Department Dept. Phone    2018 Talat Morton MD [facility] [phone]      Your Vitals Were     Blood Pressure Pulse Temperature Respirations Height Weight    146/80 (BP Location: Right arm) 96 99.3  F (37.4  C) (Oral) 18 1.702 m (5' 7\") 90.7 kg (200 lb)    Pulse Oximetry BMI (Body Mass Index)                96% 31.32 kg/m2          [employer] Information     [employer] lets you send messages to your doctor, view your test results, renew your prescriptions, schedule appointments and more. To sign up, go to [email or web address] . Click on \"Log in\" on the left side of the screen, which will take you to the Welcome page. Then click on \"Sign up Now\" on the right side of the page.     You will be asked to enter the access code listed below, as well as some personal information. Please follow the directions to create your username and password.     Your access code is: [email or web address]  Expires: 2018 10:21 AM     Your access code will  in 90 days. If you need help or a new code, please call your [facility] clinic or [phone].        Care EveryWhere ID     This is your Care EveryWhere ID. This could be used by other organizations to access your [facility] medical records  [id number]        Equal Access to Services     [facility] LAINEY : Sosa Pardo, te barrett, billy gonzales, kim guillen. So [facility] [phone].    ATENCIÓN: Si habla español, tiene a chilel disposición " servicios gratuitos de asistencia lingüística. Azul hardy [phone].    We comply with applicable federal civil rights laws and [place] laws. We do not discriminate on the basis of race, color, national origin, age, disability, sex, sexual orientation, or gender identity.               Review of your medicines      UNREVIEWED medicines. Ask your doctor about these medicines        Dose / Directions    ASPIRIN EC PO        Dose:  81 mg   Take 81 mg by mouth every evening   Refills:  0         START taking        Dose / Directions    oseltamivir 75 MG capsule   Commonly known as:  TAMIFLU   Indication:  Flu        Dose:  75 mg   Take 1 capsule (75 mg) by mouth 2 times daily   Quantity:  8 capsule   Refills:  0         CONTINUE these medicines which may have CHANGED, or have new prescriptions. If we are uncertain of the size of tablets/capsules you have at home, strength may be listed as something that might have changed.        Dose / Directions    BASAGLAR 100 UNIT/ML injection   This may have changed:    - how much to take  - how to take this  - when to take this  - additional instructions        Reduce to 7 units daily until appetite improves then increase back to 14 units   Refills:  0       metFORMIN 500 MG 24 hr tablet   Commonly known as:  GLUCOPHAGE-XR   This may have changed:    - how much to take  - how to take this  - when to take this  - additional instructions        Hold Metformin until appetite improves to baseline   Quantity:  30 tablet   Refills:  0         CONTINUE these medicines which have NOT CHANGED        Dose / Directions    AMLODIPINE BESYLATE PO        Dose:  10 mg   Take 10 mg by mouth every evening   Refills:  0       LIPITOR PO        Dose:  10 mg   Take 10 mg by mouth every evening   Refills:  0       LISINOPRIL PO        Dose:  40 mg   Take 40 mg by mouth every evening   Refills:  0            Where to get your medicines      These medications were sent to [facility]  [place] - [address]  [address] [address]     Phone:  [phone]     oseltamivir 75 MG capsule                Protect others around you: Learn how to safely use, store and throw away your medicines at www.disposemymeds.org.        ANTIBIOTIC INSTRUCTION     You've Been Prescribed an Antibiotic - Now What?  Your healthcare team thinks that you or your loved one might have an infection. Some infections can be treated with antibiotics, which are powerful, life-saving drugs. Like all medications, antibiotics have side effects and should only be used when necessary. There are some important things you should know about your antibiotic treatment.      Your healthcare team may run tests before you start taking an antibiotic.    Your team may take samples (e.g., from your blood, urine or other areas) to run tests to look for bacteria. These test can be important to determine if you need an antibiotic at all and, if you do, which antibiotic will work best.      Within a few days, your healthcare team might change or even stop your antibiotic.    Your team may start you on an antibiotic while they are working to find out what is making you sick.    Your team might change your antibiotic because test results show that a different antibiotic would be better to treat your infection.    In some cases, once your team has more information, they learn that you do not need an antibiotic at all. They may find out that you don't have an infection, or that the antibiotic you're taking won't work against your infection. For example, an infection caused by a virus can't be treated with antibiotics. Staying on an antibiotic when you don't need it is more likely to be harmful than helpful.      You may experience side effects from your antibiotic.    Like all medications, antibiotics have side effects. Some of these can be serious.    Let you healthcare team know if you have any known allergies when you  are admitted to the hospital.    One significant side effect of nearly all antibiotics is the risk of severe and sometimes deadly diarrhea caused by Clostridium difficile (C. Difficile). This occurs when a person takes antibiotics because some good germs are destroyed. Antibiotic use allows C. diificile to take over, putting patients at high risk for this serious infection.    As a patient or caregiver, it is important to understand your or your loved one's antibiotic treatment. It is especially important for caregivers to speak up when patients can't speak for themselves. Here are some important questions to ask your healthcare team.    What infection is this antibiotic treating and how do you know I have that infection?    What side effects might occur from this antibiotic?    How long will I need to take this antibiotic?    Is it safe to take this antibiotic with other medications or supplements (e.g., vitamins) that I am taking?     Are there any special directions I need to know about taking this antibiotic? For example, should I take it with food?    How will I be monitored to know whether my infection is responding to the antibiotic?    What tests may help to make sure the right antibiotic is prescribed for me?      Information provided by:  www.cdc.gov/getsmart  U.S. Department of Health and Human Services  Centers for disease Control and Prevention  National Center for Emerging and Zoonotic Infectious Diseases  Division of Healthcare Quality Promotion             Medication List: This is a list of all your medications and when to take them. Check marks below indicate your daily home schedule. Keep this list as a reference.      Medications           Morning Afternoon Evening Bedtime As Needed    AMLODIPINE BESYLATE PO   Take 10 mg by mouth every evening                                   ASPIRIN EC PO   Take 81 mg by mouth every evening                                   BASAGLAR 100 UNIT/ML injection    Reduce to 7 units daily until appetite improves then increase back to 14 units   Last time this was given:  10 Units on 1/27/2018 10:26 PM                                LIPITOR PO   Take 10 mg by mouth every evening                                   LISINOPRIL PO   Take 40 mg by mouth every evening                                   metFORMIN 500 MG 24 hr tablet   Commonly known as:  GLUCOPHAGE-XR   Hold Metformin until appetite improves to baseline                                oseltamivir 75 MG capsule   Commonly known as:  TAMIFLU   Take 1 capsule (75 mg) by mouth 2 times daily   Last time this was given:  75 mg on 1/28/2018  8:07 AM   Next Dose Due:  Next dose this evening (1/28)

## 2018-01-27 NOTE — ED NOTES
Pt tested positive for the FLU (A) at  today.  Pt sent here for further plan of care as pt feel crummy.  Pt is a diabetic .  Some labs were drawn at , results with Pt.  PIV started at , IVF infusing.

## 2018-01-27 NOTE — LETTER
Hospitalist Service  [facility]   [address] [place], [place] [address]  Answering Service [phone]    To whom it may concern,    Fawad Wilkinson was cared for by myself and the Hospitalist service at [facility] from 1/27/2018 to  1/28/2018.     He should be excused from work through 1/30/18.     He can follow up with his primary care physician for adjustment of these recommendations as needed.    Sincerely,         Rani Echeverria PA-C

## 2018-01-27 NOTE — IP AVS SNAPSHOT
[facility]    [address] [place] [id number]    Phone:  [phone]                                       After Visit Summary   1/27/2018    Fawad Wilkinson    MRN: [id number]           After Visit Summary Signature Page     I have received my discharge instructions, and my questions have been answered. I have discussed any challenges I see with this plan with the nurse or doctor.    ..........................................................................................................................................  Patient/Patient Representative Signature      ..........................................................................................................................................  Patient Representative Print Name and Relationship to Patient    ..................................................               ................................................  Date                                            Time    ..........................................................................................................................................  Reviewed by Signature/Title    ...................................................              ..............................................  Date                                                            Time

## 2018-01-28 VITALS
DIASTOLIC BLOOD PRESSURE: 80 MMHG | HEIGHT: 67 IN | BODY MASS INDEX: 31.39 KG/M2 | SYSTOLIC BLOOD PRESSURE: 146 MMHG | TEMPERATURE: 99.3 F | WEIGHT: 200 LBS | OXYGEN SATURATION: 96 % | RESPIRATION RATE: 18 BRPM | HEART RATE: 96 BPM

## 2018-01-28 LAB
ANION GAP SERPL CALCULATED.3IONS-SCNC: 6 MMOL/L (ref 3–14)
BASOPHILS # BLD AUTO: 0 10E9/L (ref 0–0.2)
BASOPHILS NFR BLD AUTO: 0.4 %
BUN SERPL-MCNC: 20 MG/DL (ref 7–30)
CALCIUM SERPL-MCNC: 8.2 MG/DL (ref 8.5–10.1)
CHLORIDE SERPL-SCNC: 107 MMOL/L (ref 94–109)
CO2 SERPL-SCNC: 27 MMOL/L (ref 20–32)
CREAT SERPL-MCNC: 0.74 MG/DL (ref 0.66–1.25)
DIFFERENTIAL METHOD BLD: ABNORMAL
EOSINOPHIL # BLD AUTO: 0.1 10E9/L (ref 0–0.7)
EOSINOPHIL NFR BLD AUTO: 1.4 %
ERYTHROCYTE [DISTWIDTH] IN BLOOD BY AUTOMATED COUNT: 12.5 % (ref 10–15)
GFR SERPL CREATININE-BSD FRML MDRD: >90 ML/MIN/1.7M2
GLUCOSE BLDC GLUCOMTR-MCNC: 103 MG/DL (ref 70–99)
GLUCOSE BLDC GLUCOMTR-MCNC: 75 MG/DL (ref 70–99)
GLUCOSE SERPL-MCNC: 103 MG/DL (ref 70–99)
HBA1C MFR BLD: 6.6 % (ref 4.3–6)
HCT VFR BLD AUTO: 36.9 % (ref 40–53)
HGB BLD-MCNC: 12.7 G/DL (ref 13.3–17.7)
IMM GRANULOCYTES # BLD: 0 10E9/L (ref 0–0.4)
IMM GRANULOCYTES NFR BLD: 0.4 %
LYMPHOCYTES # BLD AUTO: 0.6 10E9/L (ref 0.8–5.3)
LYMPHOCYTES NFR BLD AUTO: 12 %
MCH RBC QN AUTO: 31.3 PG (ref 26.5–33)
MCHC RBC AUTO-ENTMCNC: 34.4 G/DL (ref 31.5–36.5)
MCV RBC AUTO: 91 FL (ref 78–100)
MONOCYTES # BLD AUTO: 0.6 10E9/L (ref 0–1.3)
MONOCYTES NFR BLD AUTO: 11.8 %
NEUTROPHILS # BLD AUTO: 3.6 10E9/L (ref 1.6–8.3)
NEUTROPHILS NFR BLD AUTO: 74 %
NRBC # BLD AUTO: 0 10*3/UL
NRBC BLD AUTO-RTO: 0 /100
PLATELET # BLD AUTO: 94 10E9/L (ref 150–450)
POTASSIUM SERPL-SCNC: 3.6 MMOL/L (ref 3.4–5.3)
RBC # BLD AUTO: 4.06 10E12/L (ref 4.4–5.9)
SODIUM SERPL-SCNC: 140 MMOL/L (ref 133–144)
WBC # BLD AUTO: 4.9 10E9/L (ref 4–11)

## 2018-01-28 PROCEDURE — 25000132 ZZH RX MED GY IP 250 OP 250 PS 637: Performed by: INTERNAL MEDICINE

## 2018-01-28 PROCEDURE — 00000146 ZZHCL STATISTIC GLUCOSE BY METER IP

## 2018-01-28 PROCEDURE — 99217 ZZC OBSERVATION CARE DISCHARGE: CPT | Performed by: PHYSICIAN ASSISTANT

## 2018-01-28 PROCEDURE — 36415 COLL VENOUS BLD VENIPUNCTURE: CPT | Performed by: INTERNAL MEDICINE

## 2018-01-28 PROCEDURE — 96361 HYDRATE IV INFUSION ADD-ON: CPT

## 2018-01-28 PROCEDURE — 80048 BASIC METABOLIC PNL TOTAL CA: CPT | Performed by: INTERNAL MEDICINE

## 2018-01-28 PROCEDURE — G0378 HOSPITAL OBSERVATION PER HR: HCPCS

## 2018-01-28 PROCEDURE — 83036 HEMOGLOBIN GLYCOSYLATED A1C: CPT | Performed by: INTERNAL MEDICINE

## 2018-01-28 PROCEDURE — 85025 COMPLETE CBC W/AUTO DIFF WBC: CPT | Performed by: INTERNAL MEDICINE

## 2018-01-28 RX ORDER — METFORMIN HCL 500 MG
TABLET, EXTENDED RELEASE 24 HR ORAL
Qty: 30 TABLET | COMMUNITY
Start: 2018-01-28

## 2018-01-28 RX ORDER — OSELTAMIVIR PHOSPHATE 75 MG/1
75 CAPSULE ORAL 2 TIMES DAILY
Qty: 8 CAPSULE | Refills: 0 | Status: SHIPPED | OUTPATIENT
Start: 2018-01-28 | End: 2022-03-27

## 2018-01-28 RX ORDER — INSULIN GLARGINE 100 [IU]/ML
INJECTION, SOLUTION SUBCUTANEOUS
COMMUNITY
Start: 2018-01-28 | End: 2022-03-27

## 2018-01-28 RX ADMIN — OSELTAMIVIR PHOSPHATE 75 MG: 75 CAPSULE ORAL at 08:07

## 2018-01-28 NOTE — PHARMACY-ADMISSION MEDICATION HISTORY
Admission medication history interview status for the 1/27/2018  admission is complete. See EPIC admission navigator for prior to admission medications     Medication history source reliability:Good    Actions taken by pharmacist (provider contacted, etc):None     Additional medication history information not noted on PTA med list :None    Medication reconciliation/reorder completed by provider prior to medication history? No    Time spent in this activity: 15 minutes    Prior to Admission medications    Medication Sig Last Dose Taking? Auth Provider   Atorvastatin Calcium (LIPITOR PO) Take 10 mg by mouth every evening 1/26/2018 at pm Yes Unknown, Entered By History   LISINOPRIL PO Take 40 mg by mouth every evening 1/26/2018 at pm Yes Unknown, Entered By History   ASPIRIN EC PO Take 81 mg by mouth every evening 1/26/2018 at pm Yes Unknown, Entered By History   BASAGLAR 100 UNIT/ML injection Inject 14 Units Subcutaneous every evening 1/26/2018 at pm Yes Unknown, Entered By History   metFORMIN (GLUCOPHAGE-XR) 500 MG 24 hr tablet Take 2,000 mg by mouth daily (with dinner) 1/26/2018 at pm Yes Unknown, Entered By History   AMLODIPINE BESYLATE PO Take 10 mg by mouth every evening  1/26/2018 at pm Yes Reported, Patient

## 2018-01-28 NOTE — H&P
Admitted:     01/27/2018      PRIMARY CARE PHYSICIAN:  Keith Lorenzo MD      CHIEF COMPLAINT:  Cough and fevers.        HISTORY OF PRESENT ILLNESS:  Fawad Wilkinson is a 57-year-old gentleman with history noted below, including diabetes mellitus type 2, hypertension and dyslipidemia who presents with the above acute complaints.  The patient has been feeling weak with body aches, had headache and cough.  He also had vomiting episodes.  He says that the cough is productive of greenish sputum.  He does smoke.  He notes some dyspnea.  Given his symptomatology, he presented to urgent care through [facility].  There, he had laboratory evaluation that showed normal white count.  His platelet count was low at 126,000.  BMP was fairly unremarkable.  He did have an influenza testing, which was positive for influenza A.  Overall, given the patient's symptomatology and these findings, he was sent to [facility] ER for further evaluation.      Patient seen in the ER and vitals show temperature 98.4 degrees, heart rate 53, blood pressure 127/73, respiratory rate 20, O2 saturation is 97% on room air.  Had a troponin which was negative.  Chest x-ray is pending.  Overall, patient was given some Tamiflu and IV fluid bolus and a dose of Toradol.  However, he continued to feel very unwell, extremely weak and given his symptoms, request for observation admission was made.        Patient denies any chest pain.  No abdominal pain or bloody stools or diarrhea.      PAST MEDICAL HISTORY:   1.  Diabetes mellitus type 2.  Hemoglobin A1c 6.4 in August 2017.   2.  Hypertension.   3.  Dyslipidemia.   4.  Gout.   5.  Lumbar spine disease, has never required any type of intervention.      PAST SURGICAL HISTORY:  Status post left leg surgery for fracture 1991.      ALLERGIES:  NO KNOWN DRUG ALLERGIES.      HOME MEDICATIONS:   Prior to Admission Medications   Prescriptions Last Dose Informant Patient Reported? Taking?   AMLODIPINE BESYLATE PO 1/26/2018 at  pm Self Yes Yes   Sig: Take 10 mg by mouth every evening    ASPIRIN EC PO 1/26/2018 at pm Self Yes Yes   Sig: Take 81 mg by mouth every evening   Atorvastatin Calcium (LIPITOR PO) 1/26/2018 at pm Self Yes Yes   Sig: Take 10 mg by mouth every evening   BASAGLAR 100 UNIT/ML injection 1/26/2018 at pm Self Yes Yes   Sig: Inject 14 Units Subcutaneous every evening   LISINOPRIL PO 1/26/2018 at pm Self Yes Yes   Sig: Take 40 mg by mouth every evening   metFORMIN (GLUCOPHAGE-XR) 500 MG 24 hr tablet 1/26/2018 at pm Self Yes Yes   Sig: Take 2,000 mg by mouth daily (with dinner)      Facility-Administered Medications: None       SOCIAL HISTORY:  The patient does smoke less than a pack of cigarettes per day for 10 years.  He does not drink significant alcohol.  He is .  Has 1 child.  He works as a  for [employer].      FAMILY HISTORY:  Reviewed and not felt to be contributory.      REVIEW OF SYSTEMS:  As noted in HPI, otherwise 10-point systems negative.      PHYSICAL EXAMINATION:     VITAL SIGNS:  Temperature 98.4 degrees, heart rate 53, blood pressure 127/73, respiratory rate 22, O2 saturation is 97% on room air.   GENERAL:  Alert and oriented 87-year-old male patient who is lying in bed.  He looks fatigued; otherwise, he is appropriate and follows commands.   HEENT:  Pupils equal, round, reactive.  No scleral icterus.  There is conjunctival injection.  Oropharynx reveals no gross erythema or exudate.   NECK:  No bruits, JVD or adenopathy.   HEART:  Regular rhythm without murmurs, rubs, gallops.   LUNGS:  Diminished at bases, no wheezes or crackles.   ABDOMEN:  Soft, nontender, nondistended, positive bowel sounds, no femoral bruits.   EXTREMITIES:  Show trace edema with some mild venous stasis changes.  Palpable dorsalis pedis pulses bilaterally.   NEUROLOGIC:  No gross focal motor or sensory deficits.      LABORATORY DATA:  Troponin is negative.  Labs from [facility] showed sodium 143, potassium 3.7, chloride  102, bicarbonate 28, BUN 18, creatinine 1.10, glucose 103.  CBC showed white count 9.8, hemoglobin 13.8, platelets 126,000.  Influenza antigen was positive.        ASSESSMENT:  Mr. Fawad Wilkinson is a 57-year-old gentleman with a history including diabetes mellitus type 2, hypertension, dyslipidemia, and gout who presents with cough with fevers, bodyaches and fatigue and found with influenza A.      1.  Influenza A.  Continue Tamiflu for 10 total doses.  Order all supportive cares including IV fluids and p.r.n. acetaminophen.  We will get an x-ray to check for overlying bacterial infection such as pneumonia.  Treat if indicated.    2.  Diabetes mellitus type 2.  Hemoglobin A1c 6.4 in 2017.  Prior to admission regimen consists of insulin Glargine 14 units daily at bedtime, metformin XR 2000 mg a day.  Continue glargine, but we will decrease the dose to 10 units a day daily at bedtime.  Continue metformin XR.  Order insulin correction scale.    3.  Benign essential hypertension.  Continue amlodipine 10 mg daily and lisinopril 40 mg daily.    4.  Dyslipidemia.  Continue atorvastatin.    5.  Prophylaxis.  Pneumo boots and ambulation.      CODE STATUS:  Full code.      DISPOSITION:  Pending above progress, but if he shows clinical improvement and possible discharge .         SOCORRO ORTIZ JR., MD             D: 2018   T: 2018   MT: FADI      Name:     FAWAD WILKINSON   MRN:      [id number]        Account:      [id number]   :      1960        Admitted:     2018                   Document: [id number]

## 2018-01-28 NOTE — PLAN OF CARE
"Problem: Patient Care Overview  Goal: Plan of Care/Patient Progress Review  Outcome: No Change  RN: Alert and oriented X 4, up Ind without assistive device. Admitted with Influenza A. Tamiflu given in ED. Reports generalized pain of 2/10. Initially afebrile, developed fever of 100.7 at hs, Tylenol given. Vital signs otherwise stable. States he feels \"crummy\". Reports diminished appetite, denies nausea. IVF's running. Voiding adequately. Droplet precautions maintained. MD to re-assess in AM.      "

## 2018-01-28 NOTE — ED NOTES
"[facility]  ED Nurse Handoff Report    ED Chief complaint: No chief complaint on file.      ED Diagnosis:   Final diagnoses:   Influenza A       Code Status: Full Code    Allergies: No Known Allergies    Activity level - Baseline/Home:  Independent    Activity Level - Current:   Independent     Needed?: No    Isolation: Yes  Infection: Influenza    Bariatric?: No    Vital Signs:   Vitals:    01/27/18 1606   BP: 127/72   Resp: 20   Temp: 98.4  F (36.9  C)   TempSrc: Oral   SpO2: 97%   Weight: 90.7 kg (200 lb)   Height: 1.702 m (5' 7\")       Cardiac Rhythm: ,        Pain level: 0-10 Pain Scale: 5    Is this patient confused?: No    Patient Report: Initial Complaint: Generlized weakness  Focused Assessment: Exam WNL, c/o body aches, fever, generalized feeling \"crummy\"  Tests Performed: Labs, Xray  Abnormal Results:   Results for orders placed or performed during the hospital encounter of 01/27/18   Troponin I   Result Value Ref Range    Troponin I ES <0.015 0.000 - 0.045 ug/L   EKG 12 lead   Result Value Ref Range    Interpretation ECG Click View Image link to view waveform and result        Treatments provided: Medications, IVF    Family Comments: none    OBS brochure/video discussed/provided to patient: Yes    ED Medications:   Medications   oseltamivir (TAMIFLU) capsule 75 mg (not administered)   lactated ringers BOLUS 1,000 mL (1,000 mLs Intravenous New Bag 1/27/18 1703)   ketorolac (TORADOL) injection 30 mg (30 mg Intravenous Given 1/27/18 1703)       Drips infusing?:  No      ED NURSE PHONE NUMBER: [phone]         "

## 2018-01-28 NOTE — PROGRESS NOTES
RECEIVING UNIT ED HANDOFF REVIEW    ED Nurse Handoff Report was reviewed by: Laureen Ramirez on January 27, 2018 at 6:34 PM

## 2018-01-28 NOTE — PLAN OF CARE
Problem: Patient Care Overview  Goal: Plan of Care/Patient Progress Review  Outcome: Adequate for Discharge Date Met: 01/28/18  VSS. A/O. DC instructions reviewed with patient. DC medication given to patient and reviewed including side effects/administration. All questions answered. Patient verbalizes understanding of DC care. Spoke with patients mom, Amanda, who verbalizes understanding of DC care. Work note given to patient for employer.

## 2018-01-28 NOTE — PLAN OF CARE
Problem: Patient Care Overview  Goal: Individualization & Mutuality  Outcome: No Change  Observation Goals  -diagnostic tests and consults completed and resulted -yes  -vital signs normal or at patient baseline -no

## 2018-01-28 NOTE — PLAN OF CARE
Problem: Patient Care Overview  Goal: Individualization & Mutuality  Outcome: No Change  Observation Goals  -diagnostic tests and consults completed and resulted -yes  -vital signs normal or at patient baseline -no  Pt A&O, VSS except pt had fever at shift change.  Pt treated with tylenol and fever did come down.  Pt continues with IVF at 75cc/hr. Pt stating that he just wants to sleep.  Pt up with SBA to bathroom.  Will continue to monitor.

## 2018-01-28 NOTE — ED NOTES
Pharmacy into see patient.  Xray called will be coming for patient to complete chest xray.      Will send to Obs unit shortly.

## 2018-01-28 NOTE — PLAN OF CARE
Problem: Patient Care Overview  Goal: Plan of Care/Patient Progress Review  Outcome: Improving  PRIMARY DIAGNOSIS: GENERALIZED WEAKNESS    OUTPATIENT/OBSERVATION GOALS TO BE MET BEFORE DISCHARGE  1. Orthostatic performed: No    2. Tolerating PO medications: Yes    3. Return to near baseline physical activity: Yes    4. Cleared for discharge by consultants (if involved): N/A    Discharge Planner Nurse   Safe discharge environment identified: Yes  Barriers to discharge: No       Entered by: Laureen Ramirez 01/28/2018 10:34 AM     Please review provider order for any additional goals.   Nurse to notify provider when observation goals have been met and patient is ready for discharge.

## 2021-05-30 ENCOUNTER — RECORDS - HEALTHEAST (OUTPATIENT)
Dept: ADMINISTRATIVE | Facility: CLINIC | Age: 61
End: 2021-05-30

## 2021-09-27 NOTE — DISCHARGE SUMMARY
[facility]  Discharge Summary        Fawad Wilkinson MRN# [id number]   YOB: 1960 Age: 57 year old     Date of Admission:  1/27/2018  Date of Discharge:  1/28/2018  Admitting Physician:  Talat Morton MD  Discharge Physician:  Rani Echeverria PA-C  Discharging Service:   Hospitalist     Primary Provider: Keith Lorenzo [phone]     DISCHARGE DIAGNOSES/PROBLEM ORIENTED HOSPITAL COURSE:   Mr. Fawad Wilkinson is a 57-year-old gentleman with a history including diabetes mellitus type 2, hypertension, dyslipidemia, and gout who presents with cough with fevers, bodyaches and fatigue and found with influenza A. He was admitted for obsevation. For additional details regarding the HPI, please see H&P by Talat Morton MD      Influenza A: CBC without leukocytosis. CXR clear. Not Hypoxic  - Tamiflu x 5 days  - Supportive cares    Acute on Chronic Thrombocytopenia: Platelets in 2013: 129  - Trend 126 (OSH)--94  - Suspect exacerbated by viral illness  - Repeat CBC at PCP follow up      Diabetes mellitus type 2.  Hemoglobin A1c 6.6: PTA regimen includes Lantus 14 units daily and Metformin XR 2000 mg/d Prior to admission regimen consists of insulin Glargine 14 units daily at bedtime, metformin XR 2000 mg a day.    - Currently diminished oral intake in the setting of influenza  - Will hold PTA metformin and reduce Lantus to 7 units until oral intake improves     Essential hypertension.    -Continue amlodipine 10 mg daily and lisinopril 40 mg daily.    CODE STATUS:  Full Code    BRIEF HOSPITAL STAY SUMMARY (SENT HOME WITH PATIENT IN AVS):   Reason for your hospital stay       You were admitted for evaluation of cough and weakness. You were found to   have influenza A. You were started on Tamiflu which may help shorten the   duration of symptoms but you will still likely feel weak and unwell for   several days. Take a reduce dose of Insulin and hold your Metformin until   your appetite is  "back to baseline. Follow up with your regular MD in 7-10   days for a recheck.                      PENDING RESULTS:  Unresulted Labs Ordered in the Past 30 Days of this Admission     No orders found for last 61 day(s).          DISCHARGE INSTRUCTIONS AND FOLLOW-UP:  Follow-up Appointments     Follow-up and recommended labs and tests        Follow up with primary care provider, Keith Lorenzo, within 7 days to   evaluate treatment change and for hospital follow- up.  The following   labs/tests are recommended: CBC.                    DISCHARGE DISPOSITION:  Discharged to home    DISCHARGE MEDICATIONS:  Current Discharge Medication List      START taking these medications    Details   oseltamivir (TAMIFLU) 75 MG capsule Take 1 capsule (75 mg) by mouth 2 times daily  Qty: 8 capsule, Refills: 0    Associated Diagnoses: Influenza A         CONTINUE these medications which have CHANGED    Details   BASAGLAR 100 UNIT/ML injection Reduce to 7 units daily until appetite improves then increase back to 14 units      metFORMIN (GLUCOPHAGE-XR) 500 MG 24 hr tablet Hold Metformin until appetite improves to baseline  Qty: 30 tablet         CONTINUE these medications which have NOT CHANGED    Details   Atorvastatin Calcium (LIPITOR PO) Take 10 mg by mouth every evening      LISINOPRIL PO Take 40 mg by mouth every evening      ASPIRIN EC PO Take 81 mg by mouth every evening      AMLODIPINE BESYLATE PO Take 10 mg by mouth every evening                CONSULTATIONS THIS HOSPITAL STAY:  None    CONDITION AND PHYSICAL EXAM ON DISCHARGE:  Discharge Condition: Stable    /80 (BP Location: Right arm)  Pulse 96  Temp 99.3  F (37.4  C) (Oral)  Resp 18  Ht 1.702 m (5' 7\")  Wt 90.7 kg (200 lb)  SpO2 96%  BMI 31.32 kg/m2  Gen: laying in bed, ill appearing  HEENT: normocephalic; oropharynx clear  Card: RRR, S1, S2, no murmurs  Resp: lungs clear to auscultation bilaterally  GI: abdomen soft, not-tender, non-distended, +BS  Ext: no LE " edema  Neuro: CX II-XII grossly in tact; ROM in all four extremities grossly in tact  Psych: alert and oriented x3; flat affect    DISCHARGE ORDERS FOR FACILITY:  After Care Instructions     Activity       Your activity upon discharge: activity as tolerated            Diet       Follow this diet upon discharge: Orders Placed This Encounter      Regular Diet Adult                          DISCHARGE TIME:  Greater than 30 minutes.    IMAGING RESULTS FROM THIS HOSPITAL STAY:  Results for orders placed or performed during the hospital encounter of 01/27/18   XR Chest 2 Views    Narrative    CHEST TWO VIEW   1/27/2018 6:45 PM     HISTORY: Cough, fever.     COMPARISON: Chest x-rays dated 7/6/2013.    FINDINGS:  Lungs are mildly hypoaerated but are otherwise grossly  clear. Heart size and pulmonary vascularity are within normal limits.  No pneumothorax or significant pleural fluid collection. Minimal  perihilar peribronchial cuffing could represent mild bronchitis  although no hyperaeration is seen.      Impression    IMPRESSION: No evidence of acute cardiopulmonary disease is seen.  Lungs are mildly hypoaerated.    TOÑO ROCK MD       MOST RECENT LAB RESULTS:  Most Recent 3 CBC's:  Recent Labs   Lab Test  01/28/18   0650  07/07/13   0847  07/06/13   0030   WBC  4.9  6.7  17.7*   HGB  12.7*  11.5*  13.9   MCV  91  89  88   PLT  94*  129*  258      Most Recent 3 BMP's:  Recent Labs   Lab Test  01/28/18   0650  07/07/13   0847  07/06/13 2007   NA  140  142  142   POTASSIUM  3.6  3.9  3.5   CHLORIDE  107  111*  110*   CO2  27  21  21   BUN  20  22  31*   CR  0.74  0.93  1.76*   ANIONGAP  6  9  11   TARIQ  8.2*  8.2*  8.0*   GLC  103*  118*  126*     Most Recent 3 Troponin's:  Recent Labs   Lab Test  01/27/18   1715  07/06/13   0747  07/06/13   0030   TROPI  <0.015  <0.012  0.038*     Most Recent 3 INR's:No lab results found.  Most Recent 2 LFT's:  Recent Labs   Lab Test  07/06/13   0030   AST  35   ALT  34   ALKPHOS  101    BILITOTAL  0.9     Most Recent Cholesterol Panel:No lab results found.  Most Recent 6 Bacteria Isolates From Any Culture (See EPIC Reports for Culture Details):No lab results found.  Most Recent TSH, T4 and HgbA1c:   Recent Labs   Lab Test  01/28/18   0650   A1C  6.6*          pulse oximetry

## 2022-03-27 ENCOUNTER — HOSPITAL ENCOUNTER (EMERGENCY)
Facility: CLINIC | Age: 62
Discharge: HOME | End: 2022-03-27
Attending: EMERGENCY MEDICINE | Admitting: EMERGENCY MEDICINE
Payer: COMMERCIAL

## 2022-03-27 ENCOUNTER — APPOINTMENT (OUTPATIENT)
Dept: GENERAL RADIOLOGY | Facility: CLINIC | Age: 62
End: 2022-03-27
Attending: EMERGENCY MEDICINE
Payer: COMMERCIAL

## 2022-03-27 VITALS
HEART RATE: 92 BPM | OXYGEN SATURATION: 92 % | WEIGHT: 200 LBS | BODY MASS INDEX: 32.14 KG/M2 | SYSTOLIC BLOOD PRESSURE: 137 MMHG | DIASTOLIC BLOOD PRESSURE: 92 MMHG | RESPIRATION RATE: 16 BRPM | TEMPERATURE: 98.6 F | HEIGHT: 66 IN

## 2022-03-27 DIAGNOSIS — R05.9 COUGH: ICD-10-CM

## 2022-03-27 DIAGNOSIS — R06.02 SHORTNESS OF BREATH: ICD-10-CM

## 2022-03-27 DIAGNOSIS — J40 BRONCHITIS: ICD-10-CM

## 2022-03-27 DIAGNOSIS — Z11.52 ENCOUNTER FOR SCREENING LABORATORY TESTING FOR SEVERE ACUTE RESPIRATORY SYNDROME CORONAVIRUS 2 (SARS-COV-2): ICD-10-CM

## 2022-03-27 DIAGNOSIS — F17.210 CIGARETTE SMOKER: ICD-10-CM

## 2022-03-27 LAB
FLUAV RNA SPEC QL NAA+PROBE: NEGATIVE
FLUBV RNA RESP QL NAA+PROBE: NEGATIVE
SARS-COV-2 RNA RESP QL NAA+PROBE: NEGATIVE

## 2022-03-27 PROCEDURE — 250N000013 HC RX MED GY IP 250 OP 250 PS 637: Performed by: EMERGENCY MEDICINE

## 2022-03-27 PROCEDURE — 94640 AIRWAY INHALATION TREATMENT: CPT

## 2022-03-27 PROCEDURE — 87636 SARSCOV2 & INF A&B AMP PRB: CPT | Performed by: EMERGENCY MEDICINE

## 2022-03-27 PROCEDURE — C9803 HOPD COVID-19 SPEC COLLECT: HCPCS

## 2022-03-27 PROCEDURE — 99284 EMERGENCY DEPT VISIT MOD MDM: CPT | Mod: 25

## 2022-03-27 PROCEDURE — 250N000012 HC RX MED GY IP 250 OP 636 PS 637: Performed by: EMERGENCY MEDICINE

## 2022-03-27 PROCEDURE — 99284 EMERGENCY DEPT VISIT MOD MDM: CPT | Performed by: EMERGENCY MEDICINE

## 2022-03-27 PROCEDURE — 71045 X-RAY EXAM CHEST 1 VIEW: CPT

## 2022-03-27 RX ORDER — AZITHROMYCIN 250 MG/1
TABLET, FILM COATED ORAL
Qty: 6 TABLET | Refills: 0 | Status: SHIPPED | OUTPATIENT
Start: 2022-03-27 | End: 2022-04-01

## 2022-03-27 RX ORDER — ALBUTEROL SULFATE 90 UG/1
2 AEROSOL, METERED RESPIRATORY (INHALATION) EVERY 6 HOURS PRN
Qty: 8 G | Refills: 0 | Status: SHIPPED | OUTPATIENT
Start: 2022-03-27

## 2022-03-27 RX ORDER — PREDNISONE 20 MG/1
TABLET ORAL
Qty: 8 TABLET | Refills: 0 | Status: SHIPPED | OUTPATIENT
Start: 2022-03-27

## 2022-03-27 RX ORDER — ALBUTEROL SULFATE 90 UG/1
2 AEROSOL, METERED RESPIRATORY (INHALATION) ONCE
Status: COMPLETED | OUTPATIENT
Start: 2022-03-27 | End: 2022-03-27

## 2022-03-27 RX ORDER — PREDNISONE 20 MG/1
40 TABLET ORAL ONCE
Status: COMPLETED | OUTPATIENT
Start: 2022-03-27 | End: 2022-03-27

## 2022-03-27 RX ADMIN — PREDNISONE 40 MG: 20 TABLET ORAL at 23:20

## 2022-03-27 RX ADMIN — ALBUTEROL SULFATE 2 PUFF: 90 AEROSOL, METERED RESPIRATORY (INHALATION) at 23:22

## 2022-03-28 NOTE — DISCHARGE INSTRUCTIONS
Please take Prednisone daily and use the albuterol inhaler as needed.  Return if any worsening symptoms.

## 2022-03-28 NOTE — ED TRIAGE NOTES
Patient states to have had a cough that came out of nowhere for the past three days, and has been unable to catch his breathe a few times. He states to have been vaccinated x2 with booster and flu vaccine. He is anxious about what is happening.

## 2022-03-28 NOTE — ED PROVIDER NOTES
[facility] EMERGENCY DEPARTMENT ([facility])  3/27/22  History   No chief complaint on file.    HPI  Fawad Wilkinson is a 61 year old male with a history notable for DM II, HTN and dyslipidemia who presents to the ED for evaluation of cough and shortness of breath.   He states that he has been coughing for the last 3 days.  He has had some intermittent shortness of breath.  He has some chest pain with coughing.  He has no orthopnea or leg swelling.  No history of DVT or PE.  He is a prior smoker and has a history of bronchitis.  He received his COVID vaccine with booster.      I have reviewed the Medications, Allergies, Past Medical and Surgical History, and Social History in the [employer] system.  PAST MEDICAL HISTORY:   Past Medical History:   Diagnosis Date     Diabetes (H)      Hypertension        PAST SURGICAL HISTORY: No past surgical history on file.    Past medical history, past surgical history, medications, and allergies were reviewed with the patient. Additional pertinent items: None    FAMILY HISTORY: No family history on file.    SOCIAL HISTORY:   Social History     Tobacco Use     Smoking status: Current Every Day Smoker     Packs/day: 0.50     Smokeless tobacco: Not on file   Substance Use Topics     Alcohol use: No     Social history was reviewed with the patient. Additional pertinent items: None      Patient's Medications   New Prescriptions    No medications on file   Previous Medications    AMLODIPINE BESYLATE PO    Take 10 mg by mouth every evening     ASPIRIN EC PO    Take 81 mg by mouth every evening    ATORVASTATIN CALCIUM (LIPITOR PO)    Take 10 mg by mouth every evening    BASAGLAR 100 UNIT/ML INJECTION    Reduce to 7 units daily until appetite improves then increase back to 14 units    LISINOPRIL PO    Take 40 mg by mouth every evening    METFORMIN (GLUCOPHAGE-XR) 500 MG 24 HR TABLET    Hold Metformin until appetite improves to baseline    OSELTAMIVIR (TAMIFLU) 75 MG CAPSULE    Take 1  capsule (75 mg) by mouth 2 times daily   Modified Medications    No medications on file   Discontinued Medications    No medications on file        No Known Allergies     Review of Systems  A complete review of systems was performed with pertinent positives and negatives noted in the HPI, and all other systems negative.    Physical Exam          Physical Exam  Vitals and nursing note reviewed.   Constitutional:       General: He is not in acute distress.     Appearance: He is not diaphoretic.   HENT:      Head: Normocephalic and atraumatic.   Cardiovascular:      Rate and Rhythm: Normal rate.   Pulmonary:      Effort: Pulmonary effort is normal. No respiratory distress.      Breath sounds: No stridor. Wheezing present. No rhonchi or rales.   Abdominal:      General: There is no distension.      Palpations: Abdomen is soft.      Tenderness: There is no abdominal tenderness. There is no guarding.   Musculoskeletal:         General: Normal range of motion.      Cervical back: Normal range of motion and neck supple.   Skin:     General: Skin is warm and dry.   Neurological:      Mental Status: He is alert and oriented to person, place, and time.      Sensory: No sensory deficit.   Psychiatric:         Mood and Affect: Mood normal.         Behavior: Behavior normal.         Thought Content: Thought content normal.         ED Course        Procedures                     The medical record was reviewed and interpreted.  Current images reviewed and interpreted: CXR clear.  Managed outpatient prescription medications.    No results found for this or any previous visit (from the past 24 hour(s)).  Medications - No data to display          Assessments & Plan (with Medical Decision Making)   Fawad presents with cough and shortness of breath for the last 2 days.  On exam, he does not appear toxic, but does have some wheezing noted on exam.  His CXR is clear with no infiltrates noted.  His COVID and influenza test were negative.   He was treated with Albuterol inhaler and prednisone and felt improved.  Fawad will be discharged to complete a 5 day course of prednisone and use his inhaler PRN.  He will return if any worsening symptoms.     I have reviewed the nursing notes.    I have reviewed the findings, diagnosis, plan and need for follow up with the patient.    New Prescriptions    No medications on file       Final diagnoses:   None         3/27/2022   [facility] EMERGENCY DEPARTMENT     Shaun Pitts MD  03/28/22 [id number]

## 2024-10-30 ENCOUNTER — REFERRAL (OUTPATIENT)
Dept: TRANSPLANT | Facility: CLINIC | Age: 64
End: 2024-10-30

## 2024-10-30 DIAGNOSIS — Z01.818 ENCOUNTER FOR PRE-TRANSPLANT EVALUATION FOR KIDNEY AND PANCREAS TRANSPLANT: ICD-10-CM

## 2024-10-30 DIAGNOSIS — E11.9 DIABETES MELLITUS, TYPE 2 (H): ICD-10-CM

## 2024-10-30 DIAGNOSIS — N18.6 END STAGE RENAL DISEASE (H): ICD-10-CM

## 2024-10-30 DIAGNOSIS — N18.9 CHRONIC KIDNEY DISEASE: Primary | ICD-10-CM

## 2024-10-30 DIAGNOSIS — I10 ESSENTIAL HYPERTENSION: ICD-10-CM

## 2024-10-30 DIAGNOSIS — Z76.82 ORGAN TRANSPLANT CANDIDATE: ICD-10-CM

## 2024-10-30 PROBLEM — E78.5 DYSLIPIDEMIA: Status: ACTIVE | Noted: 2017-04-17

## 2024-10-30 PROBLEM — D89.2 BENIGN (FAMILIAL) PARAPROTEINEMIA: Status: ACTIVE | Noted: 2024-08-13

## 2024-10-30 PROBLEM — N17.9 ACUTE-ON-CHRONIC RENAL FAILURE (H): Status: ACTIVE | Noted: 2024-07-29

## 2024-10-30 PROBLEM — R60.0 BILATERAL LEG EDEMA: Status: ACTIVE | Noted: 2018-08-07

## 2024-10-30 PROBLEM — Z99.2 DEPENDENCE ON RENAL DIALYSIS (H): Chronic | Status: ACTIVE | Noted: 2024-08-13

## 2024-10-30 PROBLEM — I12.9 BENIGN HYPERTENSIVE KIDNEY DISEASE WITH CHRONIC KIDNEY DISEASE STAGE I THROUGH STAGE IV, OR UNSPECIFIED: Status: ACTIVE | Noted: 2017-04-17

## 2024-10-30 PROBLEM — M10.30 GOUT DUE TO RENAL IMPAIRMENT, UNSPECIFIED SITE: Status: ACTIVE | Noted: 2024-08-13

## 2024-10-30 PROBLEM — E11.22 TYPE 2 DIABETES MELLITUS WITH CHRONIC KIDNEY DISEASE (H): Chronic | Status: ACTIVE | Noted: 2024-08-13

## 2024-10-30 PROBLEM — D63.1 ANEMIA IN CHRONIC KIDNEY DISEASE: Chronic | Status: ACTIVE | Noted: 2024-08-13

## 2024-10-30 PROBLEM — N17.0 ACUTE KIDNEY FAILURE WITH TUBULAR NECROSIS (H): Chronic | Status: ACTIVE | Noted: 2024-08-13

## 2024-10-30 PROBLEM — N18.32 STAGE 3B CHRONIC KIDNEY DISEASE (H): Status: ACTIVE | Noted: 2023-08-10

## 2024-10-30 PROBLEM — R31.29 OTHER MICROSCOPIC HEMATURIA: Status: ACTIVE | Noted: 2023-08-10

## 2024-10-30 PROBLEM — Z79.4 ENCOUNTER FOR LONG-TERM (CURRENT) USE OF INSULIN (H): Chronic | Status: ACTIVE | Noted: 2017-04-17

## 2024-10-30 PROBLEM — N25.81 SECONDARY HYPERPARATHYROIDISM OF RENAL ORIGIN (H): Chronic | Status: ACTIVE | Noted: 2024-08-13

## 2024-10-30 NOTE — LETTER
Fawad Mullen [address]   [address]  [place] [address]                October 31, 2024                                                                                            MEDICAL RECORDS REQUEST    [facility] Records Request                      Facility: [facility]    Thank you for referring your patient to the [facility], in order to process the referral we will need the following information;    Demographics  2728 form   Immunizations records  Providers Progress notes, (last 3 note)      Please call our office at [phone] if you have any questions or concerns.                Please fax all paper records to [phone] within 3-5 business days.      Thank you,   The SOT Referral Intake Team     [facility]  Solid Organ Transplant Office  [address]  [place] [address]

## 2024-10-30 NOTE — LETTER
Fawad Wilkinson  [address] [place] [address] [place]   [address]  [place] [address]          Dear Fawad,    Thank you for your interest in the Transplant Center at [facility]. We look forward to being a part of your care team and assisting you through the transplant process.    As we discussed, your transplant coordinator is Molly Valdes (Pancreas, Kidney).  You may call your coordinator at any time with questions or concerns.  Your first scheduled call will be on 11/12/2024 between 1:00 pm-4:00 pm.  If this needs to change, call [phone].    Please complete the following.    Fill out and return the enclosed forms  Authorization for Electronic Communication  Authorization to Discuss Protected Health Information  Authorization for Release of Protected Health Information    Sign up for:  [employer], access to your electronic medical record (see enclosed pamphlet)  [employer], a transplant education website  October 31, 2024 1:13 PM - Lynne Dawson:      You can use these tools to learn more about your transplant, communicate with your care team, and track your medical details      Sincerely,      Solid Organ Transplant  [facility]     cc: Referring Physician

## 2024-10-30 NOTE — TELEPHONE ENCOUNTER
SOT KIDNEY INTAKE   Intake completed with Patient  October 30, 2024    CARE TEAM    PCP: Claudio Pavon   Referring Organization: [facility]  Referring Provider: Dr. Danny Olson  Referring Diagnosis: CKD    Insurance: Medicaid ID: [id number]    SCREENING     GFR/Date: <8 done 8/4/2024 [facility]    Is patient diabetic? Type 2 (if not diabetic go to next section)  Is patient on insulin? Yes  Is patient under the age of 65? Yes  Was patient offered a pancreas transplant referral? No    Previous transplants No  Cancer history: No  Cardiac history: No  Biopsy None  Oxygen use at rest: None with activity: None    BMI: 30.21 (BMI> 40 - RNCC call only/ no PKE date)      Is patient in a group home/assisted living? No  Does patient have a guardian? No    WRAP UP  Referral intake process completed.  Patient is aware that after financial approval is received, medical records will be requested.   Patient confirmed for a callback from transplant coordinator on 11/12/2024. (within 2 weeks)  Tentative evaluation date 01/16/2025 slot 2 .    Confirmed coordinator will discuss evaluation process in more detail at the time of their call.   Patient is aware of the need to arrange age appropriate cancer screening, vaccinations, and dental care.  Reminded patient to complete questionnaire, complete medical records release, and review packet prior to evaluation visit .  Assessed patient for special needs (ie-wheelchair, assistance, guardian, and ):  None   Patient instructed to call [phone] with questions.     Patient gave verbal consent during intake call to obtain medical records and documents outside of [facility]:  Yes  Patient agrees to [employer] Yes

## 2024-10-31 VITALS — HEIGHT: 67 IN | WEIGHT: 190 LBS | BODY MASS INDEX: 29.82 KG/M2

## 2024-11-01 ENCOUNTER — TRANSFERRED RECORDS (OUTPATIENT)
Dept: HEALTH INFORMATION MANAGEMENT | Facility: CLINIC | Age: 64
End: 2024-11-01
Payer: COMMERCIAL

## 2024-11-03 ENCOUNTER — HEALTH MAINTENANCE LETTER (OUTPATIENT)
Age: 64
End: 2024-11-03

## 2024-11-12 NOTE — TELEPHONE ENCOUNTER
Reviewed patients chart for pre-Kidney/Pancreas transplant evaluation planning.      services is required NO    Is pt able to attend virtual education class? YES, registered on 1/9/25    Pt has ESKD d/t diabetic nephropathy, has been on HD since 8/2024 and follows w/ Dr. Olson. Pt is a type 2 diabetic diagnosed around age 41 and reports he uses about 20 units of insulin/day.  He reports his BG levels range 115-140. Most recent Hgb A1c, 6.9 on 4/16/24.    Health hx: HTN, neuropathy in feet  Heart hx: none.  Lung hx: none  Surgical hx: L ankle surgery in past    Pt reports being a casual smoker in the past, denies concerns w/ alcohol, and recreational drugs. Does not have current infection, any non-healing wounds, or active cancer.    Health maintenance items: BMI 31.8.  Colonoscopy: due.  Dental: wears dentures.   Vaccinations: UTD. Pt is independent w/ ADLs.  Pt lives in [place], [place] alone.  Pt will have support following transplant. Pt is unsure of living donors.     Imaging Available: CT Ab[facility]v [facility]29/24 [facility]    Contacted patient and introduced myself as their Transplant Coordinator, also introduced the role of the Transplant Coordinator in the transplant process.  Explained the purpose of this call including reviewing next steps and answering questions.        Reviewed components of transplant evaluation process including necessary appointments, tests, and procedures.  Instructed pt to bring 1-2 people with them to eval and to eat and drink and normally on eval day    Answered questions for patient regarding evaluation, provided my name and contact information and requested they call/message with any additional questions or concerns.  Informed patient they will receive a letter with information discussed in referral call. Determined that patient would like information regarding transplant by:       Mail, Email, [employer], and/or Phone Call.  Encouraged the use of [employer].    Confirmed STD 1/16/25.  [employer] orders entered.

## 2025-01-02 ENCOUNTER — TELEPHONE (OUTPATIENT)
Dept: TRANSPLANT | Facility: CLINIC | Age: 65
End: 2025-01-02
Payer: COMMERCIAL

## 2025-01-02 NOTE — TELEPHONE ENCOUNTER
TRACY 2-week call appointment confirmation:  Patient confirmed appt date/time: Yes  Support Person attending with Patient: No,   Insurance verification: [employer] - Reminder to bring ID and Insurance card to appointment   Services Needed: No, Language: English

## 2025-02-19 ENCOUNTER — TELEPHONE (OUTPATIENT)
Dept: TRANSPLANT | Facility: CLINIC | Age: 65
End: 2025-02-19
Payer: COMMERCIAL

## 2025-02-19 NOTE — TELEPHONE ENCOUNTER
TRACY 2-week call appointment confirmation:  Patient confirmed appt date/time: Yes  Support Person attending with Patient: No,   Insurance verification: Medicare [employer] - Reminder to bring ID and Insurance card to appointment   Services Needed: No, Language: English

## 2025-03-01 ENCOUNTER — HEALTH MAINTENANCE LETTER (OUTPATIENT)
Age: 65
End: 2025-03-01

## 2025-03-05 ENCOUNTER — TELEPHONE (OUTPATIENT)
Dept: TRANSPLANT | Facility: CLINIC | Age: 65
End: 2025-03-05

## 2025-03-11 ENCOUNTER — TELEPHONE (OUTPATIENT)
Dept: TRANSPLANT | Facility: CLINIC | Age: 65
End: 2025-03-11
Payer: COMMERCIAL

## 2025-03-11 DIAGNOSIS — Z01.818 PRE-TRANSPLANT EVALUATION FOR KIDNEY TRANSPLANT: Primary | ICD-10-CM

## 2025-03-11 DIAGNOSIS — I10 ESSENTIAL HYPERTENSION: ICD-10-CM

## 2025-03-11 DIAGNOSIS — Z76.82 ORGAN TRANSPLANT CANDIDATE: ICD-10-CM

## 2025-03-11 DIAGNOSIS — E11.9 DIABETES MELLITUS, TYPE 2 (H): ICD-10-CM

## 2025-03-11 DIAGNOSIS — N18.6 END STAGE RENAL DISEASE (H): ICD-10-CM

## 2025-03-11 NOTE — TELEPHONE ENCOUNTER
Received call from the pt to reschedule PKE, he would like to take slot 1 on 4/2/25. He stated he missed his first visit as he felt overwhelmed and after speaking w/ his Nephrologist he realizes he wants to move forward with evaluation. He will bring his sister along to PKE. Orders placed.

## 2025-03-19 ENCOUNTER — TELEPHONE (OUTPATIENT)
Dept: TRANSPLANT | Facility: CLINIC | Age: 65
End: 2025-03-19
Payer: COMMERCIAL

## 2025-03-19 NOTE — TELEPHONE ENCOUNTER
Reached out to the patient, no answer, left a VM:  Patient called on: 03/19/2025  Current Appt Date/Time: 04/02/2025  Reason for Call: 2-week Reminder Call

## 2025-04-01 LAB
A1 AG RBC QL: NEGATIVE
ABO + RH BLD: NORMAL
ABO + RH BLD: NORMAL
ANTIBODY TITER IGM SCREEN: NEGATIVE
B IGG TITR SERPL: 16 {TITER}
B IGM TITR SERPL: 32 {TITER}
BLD GP AB SCN SERPL QL: NEGATIVE
SPECIMEN EXP DATE BLD: NORMAL

## 2025-04-01 NOTE — PROGRESS NOTES
[facility] SOLID ORGAN TRANSPLANT  OUTPATIENT MNT: KIDNEY PANCREAS TRANSPLANT EVALUATION    Current BMI: 31.8 (HT 65.5 in,  lbs/88 kg)  BMI guideline for kidney transplant up to a BMI of 40 / per surgeon discretion  BMI guideline for pancreas transplant up to a BMI of 35 / per surgeon discretion    Frailty Assessment-- Not Frail (1/5 points)- low activity   Handgrip Strength: 40    Reference:  Score of 0-2 = Not Frail  Score of 3-5 = Frail      TIME SPENT: 15 minutes  VISIT TYPE: Initial   REFERRING PHYSICIAN: Corinna   PT ACCOMPANIED BY: Arely    History of previous txp: none   Dialysis: PD 2024    NUTRITION ASSESSMENT  H/o DM II   Checks Bx/day (usually in the AM)- ranges from 120-150  DM meds:   ** lantus- 24 units/day   Last A1c:   Lab Results   Component Value Date    A1C 6.4 2025    A1C 6.6 2018    A1C 5.1 2013     - Meal prep & grocery shopping: pt does   - Previous RD education: yes  - Appetite: good/baseline   - Food allergies/intolerances: no  - Issues chewing or swallowing: no  - N/V/D/C: no  - Food access concerns: no    Vitamins, Supplements, Pertinent Meds: vit D, renvela (takes 90% of the time)  Herbal Medicines/Supplements: none   Protein Supplements: none     Weight hx // fluid retention:   - no MAMADOU  - wt overall stable     PHYSICAL ACTIVITY  Mountain bikes in the summer  Walking up stairs/no elevators; has an active job (part time) in maintenance  Can walk 1 block    DIET RECALL  Breakfast English muffin with PB    Lunch Pizza; bagged salad    Dinner Fried rice from a restaurant; may make hamburger helper   Rice dishes; more chicken for protein, some red meat/pork   Snacks HS- triscuits, potato chips    Beverages Lemonade (1 L/day), water, occasional soda (Ginger Ale), milk (not daily)   Alcohol 3 drinks/week (whiskey ginger ale)   Dining out 3-4x/week      LABS  3/6/25 Phos 5.7 (prev 7-8s)  K 3 - h/o wnl levels    NUTRITION DIAGNOSIS  No nutrition diagnosis  identified at this time.     NUTRITION INTERVENTION  Nutrition education provided:  Discussed sodium intake (low sodium foods and drinks, seasoning food without salt and tips for low sodium diet).  Reviewed wnl K levels, yet slightly elevated, although improving Phos levels. Reviewed protein needs being on dialysis.     Reviewed post txp diet guidelines in brief (will review in further detail post txp):  (1) Review of proper food safety measures d/t immunosuppressant therapy post-op and increased risk for food-borne illness    (2) Avoid the following post txp d/t risk for rejection, unknown effects on the organs, and/or potential interactions with immunosuppressants:  - Herbal, Chinese, holistic, chiropractic, natural, alternative medicines and supplements  - Detoxes and cleanses  - Weight loss pills  - Protein powders or other products with extracts or herbs (ie green tea extract)    (3) Med regimen and possible side effects    Patient Understanding: Pt verbalized understanding of education provided.  Expected Engagement: Good  Follow-Up Plans: PRN     NUTRITION GOALS   No nutrition goals identified at this time     Elba Smith, RD, LD, CCTD

## 2025-04-01 NOTE — PROGRESS NOTES
TRANSPLANT NEPHROLOGY RECIPIENT EVALUATION NOTE    Recs:  -ANTHONY casas    Assessment and Plan:  # {[facility] TRANSPLANT LIVING DONOR ORGAN:780860342} Transplant Evaluation: Patient is a {desc.:929547} candidate overall. {Benefits of transplant discussed (Optional):972508}    # {[facility] TRANSPLANT ESKD/CKD/DM:010151221}: ***    {[facility] Transplant ESKD/CKD/DM Optional (Optional):738133}    # Cardiac Risk: ***    # Former Smoker: only a few years 1 ppd, quit 20 years ago.     # PAD Screening: can request outside 7/2024 CT a/p for surgery review.     # Health Maintenance: {[facility] TX ANGELICA MAINT:017936034}    - Discussed the risks and benefits of a transplant, including the risk of surgery and immunosuppression medications.  Patient's overall evaluation will be discussed in the Transplant Program's regular meeting with a final recommendation on the patients suitability for transplant to be made at that time.    {FV RENAL TX [facility] KIDNEY CANDIDATE (Optional):03701146}  Patient was seen in conjunction with { RENAL TX [facility] NEPHROLOGIST (Optional):06594742} as part of a shared visit.    Evaluation:  Fawad Wilkinson was seen in consultation at the request of  {Referring Surgeon:[id number]} for evaluation as a potential {[facility] Living donor organ:617799} transplant recipient.    Reason for Visit:  Fawad Wilkinson is a 64 year old male with {[facility] TRANSPLANT ESKD/CKD/DM:037164946}, who presents for {[facility] Living donor organ:174353} transplant evaluation.    History of Present Illness:            Kidney Disease Hx:        Fawad Wilkinson is a 64-year-old male with history of ESKD from presumed type 2 diabetes. History of mild proteinuria noted in 2022. A diabetic since age 41 c/b ***. Long standing hypertension's been reasonably well-controlled chronically with medication. Historical creatinine included: 1.33 mg/dL in December 2021  ->1.69 mg/dL in October 2022 -> 3.16 mg/dL June 2023. ***on HD since 8/2024 , switched to PD  2 months  later.     Drink 1-2 whiskies          Kidney Disease Dx: Diabetes mellitus type 2       Biopsy Proven: No         On Dialysis: Yes, Date initiated:8/2024 and Dialysis Type: PD, no peritonitis,         Primary Nephrologist: Dr. Olson       H/o Kidney Stones: No       H/o Recurrent/Frequent UTI: No         Diabetic Hx: Type 2        Diagnosis Date: 41 years old and likely before        Medication History: metformin at first, 10-12  years of insulin, lantus 24 units HS,        Diabetic Control: { Diabetes Control:010021}   Last HbA1c: ***%       Hypoglycemic Unawareness: No, but never gets low,  checks once daily 120-150       End-Organ Damage due to DM: Nephropathy and Peripheral neuropathy          Cardiac/Vascular Disease Risk Factors:        Cardiac Risk Factors: {Cardiac Risk Factors:138478}       Known CAD: {YES WITH WILD CARD/NO:30747434}       Known PAD/Caludication Symptoms: {YES WITH WILD CARD/NO:92873692}       Known Heart Failure: {Cardiac Risk Factors:574625}       Arrhythmia: {YES WITH WILD CARD/NO:95870162}       Pulmonary Hypertension: {YES WITH WILD CARD/NO:43604436}       Valvular Disease: {YES WITH WILD CARD/NO:28184759}       Other: {Cardiac Risk Factors:738698}         Viral Serology Status       CMV IgG Antibody: { RENAL POS/NEG/UNK ***:580386}       EBV IgG Antibody: { RENAL POS/NEG/UNK ***:757313}         Volume Status/Weight:        Volume status: { :835500}       Weight:  { RENAL TX Recip Eval Weight:956514}       BMI: There is no height or weight on file to calculate BMI.         Functional Capacity/Frailty:        Mountain biking in the summer, 10 miles OL,     Fatigue/Decreased Energy: [x] No [] Yes Improved with dialysis    Chest Pain or SOB with Exertion: [x] No [] Yes    Significant Weight Change: [x] No [] Yes    Nausea, Vomiting or Diarrhea: [x] No [] Yes    Fever, Sweats or Chills:  [x] No [] Yes    Leg Swelling [x] No [] Yes        History of Cancer: None    Other  Significant Medical Issues: {None/***:174974}    Possible Higher Risk Donor Option Preferences:   {FV TXP PT ED HIGH KDPI DONOR (Optional):855844}   {FV TXP POSS HIGHER RISK DONOR HEP C STATUS (Optional):946202}   {FV TXP POSS HIGHER RISK OPTION DONOR HEP B (Optional):059606}   {FV TXP POSS HIGHER RISK DONOR HIV (Optional):567161}   {FV TXP PATIENT EDUCATION COMPLETE (Optional):041756}   {FV TX POSS HIGHER RISK DONOR OTHER (Optional):940445}    Allergy Testing Questions:   Medication that caused a reaction {FV RENAL ID MEDICATION ALLERGIC RX (Optional):089795}   Antibiotics used that didn't give an allergic reaction?  {FV RENAL ID NO ALLERGIC REACTION (Optional):331357}    COVID Vaccination Up To Date: { :761855}    Potential Living Kidney Donors: No    Review of Systems:  {[facility] TX WARREN:431529204}    Past Medical History:   Medical record was reviewed and PMH was discussed with patient and noted below.  Past Medical History:   Diagnosis Date    Diabetes (H)     Hypertension        Past Social History:   No past surgical history on file.  Personal history of bleeding or anesthesia problems: {YES WITH WILD CARD/NO:89981880}    Family History:  No family history on file.    Personal History:   Social History     Socioeconomic History    Marital status: Single     Spouse name: Not on file    Number of children: Not on file    Years of education: Not on file    Highest education level: Not on file   Occupational History    Not on file   Tobacco Use    Smoking status: Former     Average packs/day: 0.5 packs/day for 11.0 years (5.5 ttl pk-yrs)     Types: Cigarettes     Start date: 1/1/1980    Smokeless tobacco: Never   Substance and Sexual Activity    Alcohol use: Not Currently     Comment: Last drink 07/29/2024    Drug use: No    Sexual activity: Not on file   Other Topics Concern    Not on file   Social History Narrative    Not on file     Social Drivers of Health     Financial Resource Strain: Low Risk  (7/29/2024)     Received from [facility]    Financial Resource Strain     Difficulty of Paying Living Expenses: 3     Difficulty of Paying Living Expenses: Not on file   Food Insecurity: No Food Insecurity (7/29/2024)    [facility]    Food Insecurity     Do you worry your food will run out before you are able to buy more?: 1   Transportation Needs: No Transportation Needs (7/29/2024)    [facility]    Transportation Needs     Does lack of transportation keep you from medical appointments?: 1     Does lack of transportation keep you from work, meetings or getting things that you need?: 1   Physical Activity: Not on file   Stress: Not on file   Social Connections: Socially Integrated (7/29/2024)    Received from [facility]    Social Connections     Do you often feel lonely or isolated from those around you?: 0   Interpersonal Safety: Not on file   Housing Stability: Low Risk  (7/29/2024)    [facility]    Housing Stability     What is your housing situation today?: 1       Allergies:  No Known Allergies    Medications:  Current Outpatient Medications   Medication Sig Dispense Refill    albuterol (PROAIR HFA/PROVENTIL HFA/VENTOLIN HFA) 108 (90 Base) MCG/ACT inhaler Inhale 2 puffs into the lungs every 6 hours as needed for shortness of breath / dyspnea or wheezing 8 g 0    AMLODIPINE BESYLATE PO Take 10 mg by mouth every evening       ASPIRIN EC PO Take 81 mg by mouth every evening      Atorvastatin Calcium (LIPITOR PO) Take 10 mg by mouth every evening      LISINOPRIL PO Take 40 mg by mouth every evening      metFORMIN (GLUCOPHAGE-XR) 500 MG 24 hr tablet Hold Metformin until appetite improves to baseline 30 tablet     predniSONE (DELTASONE) 20 MG tablet Take two tablets (= 40mg) each day for 5 (five) days 8 tablet 0     No  current facility-administered medications for this visit.       Vitals:  There were no vitals taken for this visit.    Exam:  {EXAM FAST TX:717199504}    Results:   No results found for this or any previous visit (from the past 2 weeks).

## 2025-04-02 ENCOUNTER — ALLIED HEALTH/NURSE VISIT (OUTPATIENT)
Dept: TRANSPLANT | Facility: CLINIC | Age: 65
End: 2025-04-02
Attending: NURSE PRACTITIONER
Payer: COMMERCIAL

## 2025-04-02 ENCOUNTER — HOSPITAL ENCOUNTER (OUTPATIENT)
Dept: CARDIOLOGY | Facility: CLINIC | Age: 65
Discharge: HOME | End: 2025-04-02
Attending: NURSE PRACTITIONER
Payer: COMMERCIAL

## 2025-04-02 ENCOUNTER — ALLIED HEALTH/NURSE VISIT (OUTPATIENT)
Dept: TRANSPLANT | Facility: CLINIC | Age: 65
End: 2025-04-02

## 2025-04-02 ENCOUNTER — LAB (OUTPATIENT)
Dept: LAB | Facility: CLINIC | Age: 65
End: 2025-04-02
Attending: NURSE PRACTITIONER
Payer: COMMERCIAL

## 2025-04-02 VITALS
SYSTOLIC BLOOD PRESSURE: 134 MMHG | HEIGHT: 66 IN | HEART RATE: 81 BPM | WEIGHT: 194.4 LBS | OXYGEN SATURATION: 100 % | DIASTOLIC BLOOD PRESSURE: 73 MMHG | BODY MASS INDEX: 31.24 KG/M2

## 2025-04-02 DIAGNOSIS — E55.9 VITAMIN D DEFICIENCY: ICD-10-CM

## 2025-04-02 DIAGNOSIS — E11.21 TYPE 2 DIABETES MELLITUS WITH DIABETIC NEPHROPATHY, WITH LONG-TERM CURRENT USE OF INSULIN (H): ICD-10-CM

## 2025-04-02 DIAGNOSIS — Z01.818 PRE-TRANSPLANT EVALUATION FOR KIDNEY TRANSPLANT: Primary | ICD-10-CM

## 2025-04-02 DIAGNOSIS — N18.6 ESRD (END STAGE RENAL DISEASE) (H): ICD-10-CM

## 2025-04-02 DIAGNOSIS — Z01.818 PRE-TRANSPLANT EVALUATION FOR KIDNEY TRANSPLANT: ICD-10-CM

## 2025-04-02 DIAGNOSIS — I10 ESSENTIAL HYPERTENSION: ICD-10-CM

## 2025-04-02 DIAGNOSIS — Z79.4 TYPE 2 DIABETES MELLITUS WITHOUT COMPLICATION, WITH LONG-TERM CURRENT USE OF INSULIN (H): Primary | ICD-10-CM

## 2025-04-02 DIAGNOSIS — Z76.82 ORGAN TRANSPLANT CANDIDATE: ICD-10-CM

## 2025-04-02 DIAGNOSIS — N18.6 END STAGE RENAL DISEASE (H): ICD-10-CM

## 2025-04-02 DIAGNOSIS — E11.9 DIABETES MELLITUS, TYPE 2 (H): ICD-10-CM

## 2025-04-02 DIAGNOSIS — Z76.82 ORGAN TRANSPLANT CANDIDATE: Primary | ICD-10-CM

## 2025-04-02 DIAGNOSIS — E11.9 TYPE 2 DIABETES MELLITUS WITHOUT COMPLICATION, WITH LONG-TERM CURRENT USE OF INSULIN (H): Primary | ICD-10-CM

## 2025-04-02 DIAGNOSIS — Z87.891 FORMER SMOKER: ICD-10-CM

## 2025-04-02 DIAGNOSIS — Z79.4 TYPE 2 DIABETES MELLITUS WITH DIABETIC NEPHROPATHY, WITH LONG-TERM CURRENT USE OF INSULIN (H): ICD-10-CM

## 2025-04-02 LAB
ALBUMIN SERPL BCG-MCNC: 4.1 G/DL (ref 3.5–5.2)
ALP SERPL-CCNC: 105 U/L (ref 40–150)
ALT SERPL W P-5'-P-CCNC: 78 U/L (ref 0–70)
ANION GAP SERPL CALCULATED.3IONS-SCNC: 14 MMOL/L (ref 7–15)
APTT PPP: 32 SECONDS (ref 22–38)
AST SERPL W P-5'-P-CCNC: 76 U/L (ref 0–45)
ATRIAL RATE - MUSE: 78 BPM
BASOPHILS # BLD AUTO: 0.1 10E3/UL (ref 0–0.2)
BASOPHILS NFR BLD AUTO: 1 %
BILIRUB SERPL-MCNC: 0.5 MG/DL
BUN SERPL-MCNC: 43.3 MG/DL (ref 8–23)
CALCIUM SERPL-MCNC: 9 MG/DL (ref 8.8–10.4)
CHLORIDE SERPL-SCNC: 97 MMOL/L (ref 98–107)
CREAT SERPL-MCNC: 12.4 MG/DL (ref 0.67–1.17)
DIASTOLIC BLOOD PRESSURE - MUSE: NORMAL MMHG
EGFRCR SERPLBLD CKD-EPI 2021: 4 ML/MIN/1.73M2
EOSINOPHIL # BLD AUTO: 0.2 10E3/UL (ref 0–0.7)
EOSINOPHIL NFR BLD AUTO: 2 %
ERYTHROCYTE [DISTWIDTH] IN BLOOD BY AUTOMATED COUNT: 12.3 % (ref 10–15)
EST. AVERAGE GLUCOSE BLD GHB EST-MCNC: 137 MG/DL
FACTOR 2 INTERPRETATION: NORMAL
FACTOR V INTERPRETATION: NORMAL
GLUCOSE SERPL-MCNC: 155 MG/DL (ref 70–99)
HBA1C MFR BLD: 6.4 %
HBV CORE AB SERPL QL IA: NONREACTIVE
HBV SURFACE AB SERPL IA-ACNC: 44.5 M[IU]/ML
HBV SURFACE AB SERPL IA-ACNC: REACTIVE M[IU]/ML
HBV SURFACE AG SERPL QL IA: NONREACTIVE
HCO3 SERPL-SCNC: 29 MMOL/L (ref 22–29)
HCT VFR BLD AUTO: 34.2 % (ref 40–53)
HCV AB SERPL QL IA: NONREACTIVE
HGB BLD-MCNC: 11.9 G/DL (ref 13.3–17.7)
HIV 1+2 AB+HIV1 P24 AG SERPL QL IA: NONREACTIVE
IMM GRANULOCYTES # BLD: 0 10E3/UL
IMM GRANULOCYTES NFR BLD: 0 %
INR PPP: 1.03 (ref 0.85–1.15)
INTERPRETATION ECG - MUSE: NORMAL
LAB DIRECTOR COMMENTS: NORMAL
LAB DIRECTOR DISCLAIMER: NORMAL
LAB DIRECTOR INTERPRETATION: NORMAL
LAB DIRECTOR METHODOLOGY: NORMAL
LAB DIRECTOR RESULTS: NORMAL
LVEF ECHO: NORMAL
LYMPHOCYTES # BLD AUTO: 1.8 10E3/UL (ref 0.8–5.3)
LYMPHOCYTES NFR BLD AUTO: 18 %
MCH RBC QN AUTO: 32.2 PG (ref 26.5–33)
MCHC RBC AUTO-ENTMCNC: 34.8 G/DL (ref 31.5–36.5)
MCV RBC AUTO: 93 FL (ref 78–100)
MONOCYTES # BLD AUTO: 0.6 10E3/UL (ref 0–1.3)
MONOCYTES NFR BLD AUTO: 6 %
NEUTROPHILS # BLD AUTO: 7.3 10E3/UL (ref 1.6–8.3)
NEUTROPHILS NFR BLD AUTO: 73 %
NRBC # BLD AUTO: 0 10E3/UL
NRBC BLD AUTO-RTO: 0 /100
P AXIS - MUSE: 13 DEGREES
PLATELET # BLD AUTO: 129 10E3/UL (ref 150–450)
POTASSIUM SERPL-SCNC: 3.3 MMOL/L (ref 3.4–5.3)
PR INTERVAL - MUSE: 194 MS
PROT SERPL-MCNC: 6.8 G/DL (ref 6.4–8.3)
PSA SERPL DL<=0.01 NG/ML-MCNC: 1.29 NG/ML (ref 0–4.5)
QRS DURATION - MUSE: 92 MS
QT - MUSE: 416 MS
QTC - MUSE: 474 MS
R AXIS - MUSE: -5 DEGREES
RBC # BLD AUTO: 3.69 10E6/UL (ref 4.4–5.9)
SODIUM SERPL-SCNC: 140 MMOL/L (ref 135–145)
SPECIMEN TYPE: NORMAL
SYSTOLIC BLOOD PRESSURE - MUSE: NORMAL MMHG
T AXIS - MUSE: 33 DEGREES
T PALLIDUM AB SER QL: NONREACTIVE
VENTRICULAR RATE- MUSE: 78 BPM
WBC # BLD AUTO: 10 10E3/UL (ref 4–11)

## 2025-04-02 PROCEDURE — 87340 HEPATITIS B SURFACE AG IA: CPT | Performed by: NURSE PRACTITIONER

## 2025-04-02 PROCEDURE — 85730 THROMBOPLASTIN TIME PARTIAL: CPT | Performed by: NURSE PRACTITIONER

## 2025-04-02 PROCEDURE — 80053 COMPREHEN METABOLIC PANEL: CPT | Performed by: NURSE PRACTITIONER

## 2025-04-02 PROCEDURE — 99205 OFFICE O/P NEW HI 60 MIN: CPT | Performed by: NURSE PRACTITIONER

## 2025-04-02 PROCEDURE — 81240 F2 GENE: CPT | Performed by: NURSE PRACTITIONER

## 2025-04-02 PROCEDURE — 86905 BLOOD TYPING RBC ANTIGENS: CPT | Performed by: NURSE PRACTITIONER

## 2025-04-02 PROCEDURE — 85670 THROMBIN TIME PLASMA: CPT | Performed by: NURSE PRACTITIONER

## 2025-04-02 PROCEDURE — 86833 HLA CLASS II HIGH DEFIN QUAL: CPT | Performed by: NURSE PRACTITIONER

## 2025-04-02 PROCEDURE — 86704 HEP B CORE ANTIBODY TOTAL: CPT | Performed by: NURSE PRACTITIONER

## 2025-04-02 PROCEDURE — 86665 EPSTEIN-BARR CAPSID VCA: CPT | Performed by: NURSE PRACTITIONER

## 2025-04-02 PROCEDURE — 86832 HLA CLASS I HIGH DEFIN QUAL: CPT | Performed by: NURSE PRACTITIONER

## 2025-04-02 PROCEDURE — 86147 CARDIOLIPIN ANTIBODY EA IG: CPT | Performed by: NURSE PRACTITIONER

## 2025-04-02 PROCEDURE — 86644 CMV ANTIBODY: CPT | Performed by: NURSE PRACTITIONER

## 2025-04-02 PROCEDURE — G0463 HOSPITAL OUTPT CLINIC VISIT: HCPCS

## 2025-04-02 PROCEDURE — 93306 TTE W/DOPPLER COMPLETE: CPT

## 2025-04-02 PROCEDURE — 81382 HLA II TYPING 1 LOC HR: CPT | Performed by: NURSE PRACTITIONER

## 2025-04-02 PROCEDURE — G0452 MOLECULAR PATHOLOGY INTERPR: HCPCS | Mod: 26 | Performed by: STUDENT IN AN ORGANIZED HEALTH CARE EDUCATION/TRAINING PROGRAM

## 2025-04-02 PROCEDURE — 86787 VARICELLA-ZOSTER ANTIBODY: CPT | Performed by: NURSE PRACTITIONER

## 2025-04-02 PROCEDURE — 86481 TB AG RESPONSE T-CELL SUSP: CPT | Performed by: NURSE PRACTITIONER

## 2025-04-02 PROCEDURE — G0103 PSA SCREENING: HCPCS | Performed by: NURSE PRACTITIONER

## 2025-04-02 PROCEDURE — 86780 TREPONEMA PALLIDUM: CPT | Performed by: NURSE PRACTITIONER

## 2025-04-02 PROCEDURE — 86900 BLOOD TYPING SEROLOGIC ABO: CPT | Performed by: NURSE PRACTITIONER

## 2025-04-02 PROCEDURE — 83036 HEMOGLOBIN GLYCOSYLATED A1C: CPT | Performed by: NURSE PRACTITIONER

## 2025-04-02 PROCEDURE — 85004 AUTOMATED DIFF WBC COUNT: CPT | Performed by: NURSE PRACTITIONER

## 2025-04-02 PROCEDURE — 85018 HEMOGLOBIN: CPT | Performed by: NURSE PRACTITIONER

## 2025-04-02 PROCEDURE — 36415 COLL VENOUS BLD VENIPUNCTURE: CPT | Performed by: NURSE PRACTITIONER

## 2025-04-02 PROCEDURE — G0463 HOSPITAL OUTPT CLINIC VISIT: HCPCS | Performed by: SURGERY

## 2025-04-02 PROCEDURE — 85390 FIBRINOLYSINS SCREEN I&R: CPT | Mod: 26 | Performed by: PATHOLOGY

## 2025-04-02 PROCEDURE — 86850 RBC ANTIBODY SCREEN: CPT | Performed by: NURSE PRACTITIONER

## 2025-04-02 PROCEDURE — 86706 HEP B SURFACE ANTIBODY: CPT | Performed by: NURSE PRACTITIONER

## 2025-04-02 PROCEDURE — 86803 HEPATITIS C AB TEST: CPT | Performed by: NURSE PRACTITIONER

## 2025-04-02 PROCEDURE — 84681 ASSAY OF C-PEPTIDE: CPT | Performed by: NURSE PRACTITIONER

## 2025-04-02 PROCEDURE — 82040 ASSAY OF SERUM ALBUMIN: CPT | Performed by: NURSE PRACTITIONER

## 2025-04-02 PROCEDURE — G0463 HOSPITAL OUTPT CLINIC VISIT: HCPCS | Mod: 25 | Performed by: NURSE PRACTITIONER

## 2025-04-02 PROCEDURE — 86886 COOMBS TEST INDIRECT TITER: CPT | Performed by: NURSE PRACTITIONER

## 2025-04-02 PROCEDURE — 85613 RUSSELL VIPER VENOM DILUTED: CPT | Performed by: NURSE PRACTITIONER

## 2025-04-02 PROCEDURE — 81379 HLA I TYPING COMPLETE HR: CPT | Performed by: NURSE PRACTITIONER

## 2025-04-02 PROCEDURE — 85610 PROTHROMBIN TIME: CPT | Performed by: NURSE PRACTITIONER

## 2025-04-02 RX ORDER — SEVELAMER CARBONATE 800 MG/1
800 TABLET, FILM COATED ORAL
COMMUNITY
Start: 2025-01-29

## 2025-04-02 RX ORDER — FUROSEMIDE 40 MG/1
TABLET ORAL
COMMUNITY

## 2025-04-02 RX ORDER — INSULIN GLARGINE 100 [IU]/ML
INJECTION, SOLUTION SUBCUTANEOUS
COMMUNITY
Start: 2023-06-20

## 2025-04-02 RX ORDER — FAMOTIDINE 20 MG
TABLET ORAL
COMMUNITY

## 2025-04-02 RX ORDER — FLURBIPROFEN SODIUM 0.3 MG/ML
SOLUTION/ DROPS OPHTHALMIC
COMMUNITY
Start: 2025-03-10

## 2025-04-02 RX ORDER — METOPROLOL SUCCINATE 50 MG/1
TABLET, EXTENDED RELEASE ORAL
COMMUNITY

## 2025-04-02 RX ORDER — POTASSIUM CHLORIDE 750 MG/1
CAPSULE, EXTENDED RELEASE ORAL
COMMUNITY
Start: 2025-03-11

## 2025-04-02 NOTE — LETTER
4/2/2025      Fawad Wilkinson  [address] [place] [address]  I  [address]  [place] [address]      Dear Colleague,    Thank you for referring your patient, Fawad Wilkinson, to the [facility]ANSPLANT [facility]. Please see a copy of my visit note below.    Transplant Surgery Consult Note    Medical record number: [id number]  YOB: 1960,   Consult requested by Dr. Olson for evaluation of kidney and pancreas transplant candidacy.    Assessment and Recommendations: Mr. Wilkinson is a good candidate for transplantation and has a good understanding of the risks and benefits of this approach to management of renal failure and diabetes. The following issues should be addressed prior to transplant:     Mr. Wilkinson has End stage renal failure due to diabetes mellitus type 2 whose condition is not expected to resolve, is expected to progress, and is expected to continue to develop related comorbid conditions.  Cardiology consult for cardiac risk stratification to be ordered: Yes  CT abdomen and pelvis without contrast to be ordered for assessment of vascular targets: No  Transplant listing labs ordered to include HLA, ABOx2, Cr, etc.  Dietician consult ordered: Yes  Social work consult ordered: Yes  Imaging reports reviewed: CT abdomen pelvis done July 2024  Impression    1.  There is no evidence for bowel obstruction or inflammation. Mildly increased stool volume in the colon.  2.  Small nonobstructing stone right kidney. Remainder unremarkable.  Radiology images reviewed: CT scan of the abdomen pelvis done July 2024 images reviewed.  Bilateral iliac system is suitable for kidney alone and suitable for simultaneous kidney and pancreas transplant  Recipient suitable to move forward with work up of living donors:  Yes  If planning to proceed with possible pancreas transplant, will need aortoiliac duplex.    We discussed kidney versus kidney and pancreas transplant at length.  He does have some potential living  donors.  If he does have a suitable living donor, I would recommend proceeding with kidney transplant as quickly as possible.  After kidney transplant, if he is still interested in pancreas we could proceed with a pancreas after kidney.  We discussed the benefit of a simultaneous kidney and pancreas transplant from the perspective of improving kidney longevity and reduced risk of simultaneous kidney and pancreas transplant as opposed to 2 different donors from a rejection standpoint.    We discussed weight loss to improve his insulin sensitivity.  He does currently only take 24 units of insulin per day however he has not been checking his sugars except for once a day and has not had a hemoglobin A1c checked since going on peritoneal dialysis approximately 5 months ago so his control is fairly unknown at this point.      He does have some central adiposity but not prohibitive of surgery however he would benefit from weight loss if he were to proceed down the pancreas transplant route to increase the likelihood of insulin independence as well as improve his complication profile.    He will need cardiac catheterization given he has had diabetes for 23 years and he has quite a bit of calcifications in his coronary arteries on his chest CT.     The longitudinal plan of care for the diagnosis(es)/condition(s) as documented were addressed during this visit. Due to the added complexity in care, I will continue to support Fawad in the subsequent management and with ongoing continuity of care.    The majority of our visit was spent in counselling, discussing the medical and surgical risks of living or  donor kidney and pancreas transplantation, either in a simultaneous or sequential fashion. I contrasted approximate wait time for SPK vs living vs  donor kidneys from normal (0-85%) or higher (%) kidney donor profile index (KDPI) donors and their associated outcomes. I would recommend this individual to  consider kidneys from high KDPI donors. The reason for this decision is best summarized as: decreased dialysis related morbidity/mortality, accepting lower kidney graft survival rates.  Access to transplant will be impacted by living donor availability and overall candidacy for SPK, as well as the influence of blood type and degree of sensitization. We discussed advantages of preemptive transplant as well as living donor kidney transplant, and graft and patient survival outcomes associated with these options. Potential surgical complications of kidney and pancreas transplantation include bleeding, clotting, infection, wound complications, anastomotic failure and other issues such as cardiac complications, pneumonia, deep venous thrombosis, pulmonary embolism, post transplant diabetes and death. We discussed the need for protocol biopsy of the kidney and the possible need for a ureteral stent (and subsequent removal). We discussed benefits and risks associated with different approaches to exocrine drainage of pancreatic secretions. We also discussed differences in the average length of stay, recovery process, and posttransplant lab and monitoring protocol. We discussed the risk of graft rejection and recurrent diabetic nephropathy in the setting of poor glycemic control. I emphasized the need for strict immunosuppression adherence and the potential for complications of immunosuppression such as skin cancer or lymphoma, as well as a very low but not zero risk of donor-derived disease transmission risks (infection, cancer). Mr. Wilkinson asked good questions and the patient's candidacy will be reviewed at our Multidisciplinary Selection Committee. Thank you for the opportunity to participate in Mr. Wilkinson's care.    Total time: 60 minutes        Radhika Weinstein MD FACS  Associate Professor of Surgery  Director, Living Kidney Donor  Program.  ---------------------------------------------------------------------------------------------------    HPI: Mr. Wilkinson has End stage renal failure due to diabetes mellitus type 2. The patient has had diabetes for 23 years. Management is by Lantus 24 units at bedtime. The patient usually checks his blood sugar 1 times/day.  Daily blood glucoses range typically from 120 to 150.  Hypoglyemic unawareness is not an issue.  The diabetes is unknown control.    Complications of diabetes include:    Retinopathy:  No  Neuropathy: No  Gastroparesis:  No    The patient is on dialysis.    Has potential kidney donors:  Doesn't know .  Interested in participation in paired exchange if a donor is willing: Doesn't know     The patient has the following pertinent history:       No    Yes  Dialysis:    []      [x] via:     PD since 11/2024  Blood Transfusion                  []      [x]  Number of units:   Most recently: 8/2024  Pregnancy:    [x]      [] Number:       Previous Transplant:  [x]      [] Details:    Cancer    [x]      [] Comment:   Kidney stones   [x]      [] Comment:      Recurrent infections  [x]      []  Type:                  Bladder dysfunction  [x]      [] Cause:    Claudication   [x]      [] Distance:    Previous Amputation  [x]      [] Cause:     Chronic anticoagulation  [x]      [] Indication:  Denominational  [x]      []     Past Medical History:   Diagnosis Date     Diabetes (H)      Hypertension      No past surgical history on file.  No family history on file.  Social History     Socioeconomic History     Marital status: Single     Spouse name: Not on file     Number of children: Not on file     Years of education: Not on file     Highest education level: Not on file   Occupational History     Not on file   Tobacco Use     Smoking status: Former     Average packs/day: 0.5 packs/day for 11.0 years (5.5 ttl pk-yrs)     Types: Cigarettes     Start date: 1/1/1980     Smokeless tobacco: Never    Substance and Sexual Activity     Alcohol use: Not Currently     Comment: Last drink 07/29/2024     Drug use: No     Sexual activity: Not on file   Other Topics Concern     Not on file   Social History Narrative     Not on file     Social Drivers of Health     Financial Resource Strain: Low Risk  (7/29/2024)    Received from "[employer] [facility]    Financial Resource Strain      Difficulty of Paying Living Expenses: 3      Difficulty of Paying Living Expenses: Not on file   Food Insecurity: No Food Insecurity (7/29/2024)    [facility]    Food Insecurity      Do you worry your food will run out before you are able to buy more?: 1   Transportation Needs: No Transportation Needs (7/29/2024)    Received from "[employer] [facility]    Transportation Needs      Does lack of transportation keep you from medical appointments?: 1      Does lack of transportation keep you from work, meetings or getting things that you need?: 1   Physical Activity: Not on file   Stress: Not on file   Social Connections: Socially Integrated (7/29/2024)    [facility]    Social Connections      Do you often feel lonely or isolated from those around you?: 0   Interpersonal Safety: Not on file   Housing Stability: Low Risk  (7/29/2024)    Received from [facility]    Housing Stability      What is your housing situation today?: 1       ROS:   CONSTITUTIONAL:  No fevers or chills  EYES: negative for icterus  ENT:  negative for hearing loss, tinnitus and sore throat  RESPIRATORY:  negative for cough, sputum, dyspnea  CARDIOVASCULAR:  negative for chest pain Fatigue  GASTROINTESTINAL:  negative for nausea, vomiting, diarrhea or constipation  GENITOURINARY:  negative for incontinence, dysuria, bladder emptying problems  HEME:  No easy bruising  INTEGUMENT:  negative for rash and  pruritus  NEURO:  Negative for headache, seizure disorder    Allergies:   No Known Allergies    Medications:  Prescription Medications as of 4/2/2025         Rx Number Disp Refills Start End Last Dispensed Date Next Fill Date Owning Pharmacy    albuterol (PROAIR HFA/PROVENTIL HFA/VENTOLIN HFA) 108 (90 Base) MCG/ACT inhaler  8 g 0 3/27/2022 --   [id number] - [place], [place] - [address] AT [address]    Sig: Inhale 2 puffs into the lungs every 6 hours as needed for shortness of breath / dyspnea or wheezing    Class: E-Prescribe    Route: Inhalation    AMLODIPINE BESYLATE PO  -- --  --       Sig: Take 10 mg by mouth every evening     Class: Historical    Route: Oral    ASPIRIN EC PO  -- --  --       Sig: Take 81 mg by mouth every evening    Class: Historical    Route: Oral    Atorvastatin Calcium (LIPITOR PO)  -- --  --       Sig: Take 10 mg by mouth every evening    Class: Historical    Route: Oral    B-D U/F insulin pen needle  -- -- 3/10/2025 --       Class: Historical    furosemide (LASIX) 40 MG tablet  -- --  --       Sig: furosemide 40 mg tablet    Class: Historical    LANTUS SOLOSTAR 100 UNIT/ML soln  -- -- 6/20/2023 --       Sig: Lantus Solostar U-100 Insulin 100 unit/mL (3 mL) insulin pen    Class: Historical    LISINOPRIL PO  -- --  --       Sig: Take 40 mg by mouth every evening    Class: Historical    Route: Oral    metFORMIN (GLUCOPHAGE-XR) 500 MG 24 hr tablet  30 tablet -- 1/28/2018 --   [place], [place] - [address] [address]    Sig: Hold Metformin until appetite improves to baseline    Class: Historical    metoprolol succinate ER (TOPROL XL) 50 MG 24 hr tablet  -- --  --       Sig: metoprolol succinate 50 mg tablet extended release 24 hr    Class: Historical    potassium chloride ER (MICRO-K) 10 MEQ CR capsule  -- -- 3/11/2025 --       Class: Historical    predniSONE (DELTASONE) 20 MG tablet  8 tablet 0 3/27/2022 --   [facility]  "#[id number] - [place], [place] - [address] [facility] [address]    Sig: Take two tablets (= 40mg) each day for 5 (five) days    Class: E-Prescribe    sevelamer carbonate (RENVELA) 800 MG tablet  -- -- 1/29/2025 --       Sig: Take 800 mg by mouth 3 times daily (with meals).    Class: Historical    Route: Oral    Vitamin D, Cholecalciferol, 25 MCG (1000 UT) CAPS  -- --  --       Sig: Take by mouth.    Class: Historical    Route: Oral            Exam:   Pulse:  [81] 81  BP: (134)/(73) 134/73  SpO2:  [100 %] 100 %  Appearance: in no apparent distress.   Skin: normal  Head and Neck: Normal, no rashes or jaundice  Respiratory: easy respirations, no audible wheezing.  Cardiovascular: RRR  Abdomen: rounded, No distention and No surgical scars   Extremities: femoral 2+/2+, Edema, none  Neuro: without deficit     Diagnostics:   No results found for this or any previous visit (from the past 4 weeks).  No results found for: \"CPRA\"       Again, thank you for allowing me to participate in the care of your patient.        Sincerely,        Radhika Weinstein MD, MD    Electronically signed"

## 2025-04-02 NOTE — NURSING NOTE
Kidney Transplant Evaluation           The patient was provided with the following documents:  What You Need to Know About a Kidney Transplant  Adult Kidney Transplant - A Guide for Patients  SRTR Data Sheet - Kidney  Brochure - Kidney Allocation  What You Need to Know About a Pancreas Transplant  Adult Pancreas Transplant-A Guide for Patients  SRTR Data Sheet - Pancreas  Brochure - Pancreas  SRTR Data Sheet - Kidney/Pancreas  Brochure - SPK  Brochure - Multiple Listing and Waiting Time Transfer  What Every Patient Needs to Know (UNOS)  Finding a Donor  SOT Patient Handbook     Signed the  Receipt of Information for Organ Transplant Recipient, KDPI > 85%, Kidney for Life Consent. Instructed patient that his eval results will be reviewed at our Selection Committee next week after which Molly will call him with the outcome.       Summary    Team s concerns/comments: cardiology risk assessment    Candidacy category:     Action/Plan: Continue eval     Expected Selection Meeting Discussion: 04/09/2025

## 2025-04-02 NOTE — PROGRESS NOTES
"Psychosocial Assessment  Patient Name/ Age: Fawad Wilkinson 64 year old   Medical Record #: [id number]  Duration of Interview: 60 min  Process:   Face-to-Face Interview                (counseling < 50%)   Present at Appointment: Arely (friend)        : DYANA Sarah, [facility] Date:  April 2, 2025        Type of transplant: Kidney/Pancreas    Donor type:      Cadaver   Prior Transplants:    No Status of Transplant: N/A       Current Living Situation    Location:   [address] [place] [address]  [place]    [place] [address]  With Whom: lives alone with his cat       Family/ Social Support:    Fawad identified his sister, Emilia López as his primary support person following transplant. He notes that she lives in [facility] and has been present for appointments in the past. Fawad identified his secondary support has his friend, Arely. She lives around 15-20 min away from him ([place], [place]) and has supported family members in the past who navigated kidney transplants. Arely notes that she works part time and would only be available to provide transportation \"here and there\" as a back up.  Available, helpful                Available, occasional   Committed relationship:   Single   Other supports:   Fawad notes that he has two neighbors who are nurses and have checked on him in the past following medical procedures. His neighbors are Wen French and Bryanna Valdez.  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 He notes that he has a reduced rent price because of this and that his employer is very flexible to his transplant recovery needs.    Income   Salary/wages, SSDI, and Savings   Insurance      At this time, patient can afford " "medication costs:  Yes  ANASTASIIA Henson [facility]       Medical Status    Current mode of treatment for ESRD Dialysis   Complications  Fawad reports a hx of diabetes type 2. Neuropathy       Behavioral    Tobacco Use No Chemical Dependency No   Fawad reports former tobacco use. Fawad reports that he consumes approximately 2-3 servings of alcohol per week ( a serving is defined here as one, 12 oz beer, or one 4 oz glass of wine, or one 1 /2 oz of hard liquor). Pt denies any past history of abuse or dependency on alcohol or illicit drugs. He denies any current use of street drugs, including marijuana, vaping, edible marijuana, or other mood altering substances. He reports a hx of attending and completing court ordered alcohol treatment in 1994 due to a DUI. He reports that he found this to be beneficial.        Psychiatric Impairment No  Patient describes his mental health as well managed. He verbalized that he felt stressed prior to going to the hospital (08/2024), though since then has felt \"selena to have caught everything early.\" He notes that his mental health has especially improved since beginning dialysis.     Patient denies any past or present treatment for mental health issues, such as anxiety, depression, bipolar disorder, or disorders of thought such as schizophrenia or schizoaffective disorder. There is no history of personality disorder or eating disorders. He reports no hx of therapy/medication usage to manage symptoms of mental health. Patient denies any need to see a counselor or therapist at this time. Fawad denies any past suicidal ideation, plans, or past attempts. Fawad denies any use of psychotropic medications at this time or in the past. He denies any past history of hospitalization for psychiatric illnesses.     PHQ-9: 1 0-4 None/Minimal Depression    JESS-7: 0 0-4 None/Minimal Anxiety      Reading ability Good  Education Level: Technical College Recent Legal History No  Two DUIs, both prior to 2000    Coping " Style/Strategies: Support from friends/family/work team     Ability to Adhere to Complex Medical Regime: Yes     Adherence History: Per chart review, no hx of non adherence when it comes to managing health needs. Fawad did note having delayed seeking medical treatment in the past, though feels he learned his lesson after his hospitalization August 2024.  Fawad identified no barriers to navigating healthcare system, specific to the transplant processes. He reports appropriate follow up with drs visits, taking medications as prescribed, and feels well informed when it comes to managing his health.           Education  _X_ Medicare  _X_ Rehabilitation  _X_ Donor issues  _X_ Community resources  _X_ Post discharge housing  _X_ Financial resources  _X_ Medical insurance options  _X_ Psych adjustment  _X_ Family adjustment  _X_ Health Care Directive - Provided Education   Psychosocial Risks of Transplant Reviewed and Discussed:  _X_ Increased stress related to emotional,            family, social, employment or financial           situation  _X_ Affect on work and/or disability benefits  _X_ Affect on future life insurance  _X_ Transplant outcome expectations may           not be met  _X_ Mental Health Risks: anxiety,           depression, PTSD, guilt, grief and           chronic fatigue     Notable Items:   Fawad did verbalized some uncertainty and overwhelm specific to transplant. He reports that in the past this has resulted in him not attending evaluation, though feels he has processed through these feelings and was very open to learn if transplant would be a good fit for him during today's appointment.      Final Evaluation/Assessment   Patient seemed to process information well. Appeared well informed, motivated and able to follow post transplant requirements. Behavior was appropriate during interview. Has adequate income and insurance coverage. Adequate social support. No major contraindications noted for transplant.   At this time patient appears to understand the risks and benefits of transplant.      Recommendation  Acceptable    Selection Criteria Met:  Plan for support Yes   Chemical Dependence Yes   Smoking Yes   Mental Health Yes   Adequate Finances Yes    Signature: DYANA Sarah, MALIK   Title: Clinical

## 2025-04-02 NOTE — PROGRESS NOTES
Transplant Surgery Consult Note    Medical record number: [id number]  YOB: 1960,   Consult requested by Dr. Olson for evaluation of kidney and pancreas transplant candidacy.    Assessment and Recommendations: Mr. Wilkinson is a good candidate for transplantation and has a good understanding of the risks and benefits of this approach to management of renal failure and diabetes. The following issues should be addressed prior to transplant:     Mr. Wilkinson has End stage renal failure due to diabetes mellitus type 2 whose condition is not expected to resolve, is expected to progress, and is expected to continue to develop related comorbid conditions.  Cardiology consult for cardiac risk stratification to be ordered: Yes  CT abdomen and pelvis without contrast to be ordered for assessment of vascular targets: No  Transplant listing labs ordered to include HLA, ABOx2, Cr, etc.  Dietician consult ordered: Yes  Social work consult ordered: Yes  Imaging reports reviewed: CT abdomen pelvis done July 2024  Impression    1.  There is no evidence for bowel obstruction or inflammation. Mildly increased stool volume in the colon.  2.  Small nonobstructing stone right kidney. Remainder unremarkable.  Radiology images reviewed: CT scan of the abdomen pelvis done July 2024 images reviewed.  Bilateral iliac system is suitable for kidney alone and suitable for simultaneous kidney and pancreas transplant  Recipient suitable to move forward with work up of living donors:  Yes  If planning to proceed with possible pancreas transplant, will need aortoiliac duplex.    We discussed kidney versus kidney and pancreas transplant at length.  He does have some potential living donors.  If he does have a suitable living donor, I would recommend proceeding with kidney transplant as quickly as possible.  After kidney transplant, if he is still interested in pancreas we could proceed with a pancreas after kidney.  We discussed the  benefit of a simultaneous kidney and pancreas transplant from the perspective of improving kidney longevity and reduced risk of simultaneous kidney and pancreas transplant as opposed to 2 different donors from a rejection standpoint.    We discussed weight loss to improve his insulin sensitivity.  He does currently only take 24 units of insulin per day however he has not been checking his sugars except for once a day and has not had a hemoglobin A1c checked since going on peritoneal dialysis approximately 5 months ago so his control is fairly unknown at this point.      He does have some central adiposity but not prohibitive of surgery however he would benefit from weight loss if he were to proceed down the pancreas transplant route to increase the likelihood of insulin independence as well as improve his complication profile.    He will need cardiac catheterization given he has had diabetes for 23 years and he has quite a bit of calcifications in his coronary arteries on his chest CT.     The longitudinal plan of care for the diagnosis(es)/condition(s) as documented were addressed during this visit. Due to the added complexity in care, I will continue to support Fawad in the subsequent management and with ongoing continuity of care.    The majority of our visit was spent in counselling, discussing the medical and surgical risks of living or  donor kidney and pancreas transplantation, either in a simultaneous or sequential fashion. I contrasted approximate wait time for SPK vs living vs  donor kidneys from normal (0-85%) or higher (%) kidney donor profile index (KDPI) donors and their associated outcomes. I would recommend this individual to consider kidneys from high KDPI donors. The reason for this decision is best summarized as: decreased dialysis related morbidity/mortality, accepting lower kidney graft survival rates.  Access to transplant will be impacted by living donor availability and  overall candidacy for SPK, as well as the influence of blood type and degree of sensitization. We discussed advantages of preemptive transplant as well as living donor kidney transplant, and graft and patient survival outcomes associated with these options. Potential surgical complications of kidney and pancreas transplantation include bleeding, clotting, infection, wound complications, anastomotic failure and other issues such as cardiac complications, pneumonia, deep venous thrombosis, pulmonary embolism, post transplant diabetes and death. We discussed the need for protocol biopsy of the kidney and the possible need for a ureteral stent (and subsequent removal). We discussed benefits and risks associated with different approaches to exocrine drainage of pancreatic secretions. We also discussed differences in the average length of stay, recovery process, and posttransplant lab and monitoring protocol. We discussed the risk of graft rejection and recurrent diabetic nephropathy in the setting of poor glycemic control. I emphasized the need for strict immunosuppression adherence and the potential for complications of immunosuppression such as skin cancer or lymphoma, as well as a very low but not zero risk of donor-derived disease transmission risks (infection, cancer). Mr. Wilkinson asked good questions and the patient's candidacy will be reviewed at our Multidisciplinary Selection Committee. Thank you for the opportunity to participate in Mr. Wilkinson's care.    Total time: 60 minutes        Radhika Weinstein MD FACS  Associate Professor of Surgery  Director, Living Kidney Donor Program.  ---------------------------------------------------------------------------------------------------    HPI: Mr. Wilkinson has End stage renal failure due to diabetes mellitus type 2. The patient has had diabetes for 23 years. Management is by Lantus 24 units at bedtime. The patient usually checks his blood sugar 1 times/day.   Daily blood glucoses range typically from 120 to 150.  Hypoglyemic unawareness is not an issue.  The diabetes is unknown control.    Complications of diabetes include:    Retinopathy:  No  Neuropathy: No  Gastroparesis:  No    The patient is on dialysis.    Has potential kidney donors:  Doesn't know .  Interested in participation in paired exchange if a donor is willing: Doesn't know     The patient has the following pertinent history:       No    Yes  Dialysis:    []      [x] via:     PD since 11/2024  Blood Transfusion                  []      [x]  Number of units:   Most recently: 8/2024  Pregnancy:    [x]      [] Number:       Previous Transplant:  [x]      [] Details:    Cancer    [x]      [] Comment:   Kidney stones   [x]      [] Comment:      Recurrent infections  [x]      []  Type:                  Bladder dysfunction  [x]      [] Cause:    Claudication   [x]      [] Distance:    Previous Amputation  [x]      [] Cause:     Chronic anticoagulation  [x]      [] Indication:  Catholic  [x]      []     Past Medical History:   Diagnosis Date    Diabetes (H)     Hypertension      No past surgical history on file.  No family history on file.  Social History     Socioeconomic History    Marital status: Single     Spouse name: Not on file    Number of children: Not on file    Years of education: Not on file    Highest education level: Not on file   Occupational History    Not on file   Tobacco Use    Smoking status: Former     Average packs/day: 0.5 packs/day for 11.0 years (5.5 ttl pk-yrs)     Types: Cigarettes     Start date: 1/1/1980    Smokeless tobacco: Never   Substance and Sexual Activity    Alcohol use: Not Currently     Comment: Last drink 07/29/2024    Drug use: No    Sexual activity: Not on file   Other Topics Concern    Not on file   Social History Narrative    Not on file     Social Drivers of Health     Financial Resource Strain: Low Risk  (7/29/2024)    Received from [facility]    Financial Resource Strain     Difficulty of Paying Living Expenses: 3     Difficulty of Paying Living Expenses: Not on file   Food Insecurity: No Food Insecurity (7/29/2024)    [facility]    Food Insecurity     Do you worry your food will run out before you are able to buy more?: 1   Transportation Needs: No Transportation Needs (7/29/2024)    Received from [facility] [employer] [facility]    Transportation Needs     Does lack of transportation keep you from medical appointments?: 1     Does lack of transportation keep you from work, meetings or getting things that you need?: 1   Physical Activity: Not on file   Stress: Not on file   Social Connections: Socially Integrated (7/29/2024)    Received from [facility]    Social Connections     Do you often feel lonely or isolated from those around you?: 0   Interpersonal Safety: Not on file   Housing Stability: Low Risk  (7/29/2024)    Received from [facility]    Housing Stability     What is your housing situation today?: 1       ROS:   CONSTITUTIONAL:  No fevers or chills  EYES: negative for icterus  ENT:  negative for hearing loss, tinnitus and sore throat  RESPIRATORY:  negative for cough, sputum, dyspnea  CARDIOVASCULAR:  negative for chest pain Fatigue  GASTROINTESTINAL:  negative for nausea, vomiting, diarrhea or constipation  GENITOURINARY:  negative for incontinence, dysuria, bladder emptying problems  HEME:  No easy bruising  INTEGUMENT:  negative for rash and pruritus  NEURO:  Negative for headache, seizure disorder    Allergies:   No Known Allergies    Medications:  Prescription Medications as of 4/2/2025         Rx Number Disp Refills Start End Last Dispensed Date Next Fill Date Owning Pharmacy    albuterol (PROAIR HFA/PROVENTIL HFA/VENTOLIN HFA) 108 (90 Base) MCG/ACT inhaler  8 g 0 3/27/2022 --   [id number] - [place], [place] - [address] AT [address]    Sig: Inhale 2 puffs into the lungs every 6 hours as needed for shortness of breath / dyspnea or wheezing    Class: E-Prescribe    Route: Inhalation    AMLODIPINE BESYLATE PO  -- --  --       Sig: Take 10 mg by mouth every evening     Class: Historical    Route: Oral    ASPIRIN EC PO  -- --  --       Sig: Take 81 mg by mouth every evening    Class: Historical    Route: Oral    Atorvastatin Calcium (LIPITOR PO)  -- --  --       Sig: Take 10 mg by mouth every evening    Class: Historical    Route: Oral    B-D U/F insulin pen needle  -- -- 3/10/2025 --       Class: Historical    furosemide (LASIX) 40 MG tablet  -- --  --       Sig: furosemide 40 mg tablet    Class: Historical    LANTUS SOLOSTAR 100 UNIT/ML soln  -- -- 6/20/2023 --       Sig: Lantus Solostar U-100 Insulin 100 unit/mL (3 mL) insulin pen    Class: Historical    LISINOPRIL PO  -- --  --       Sig: Take 40 mg by mouth every evening    Class: Historical    Route: Oral    metFORMIN (GLUCOPHAGE-XR) 500 MG 24 hr tablet  30 tablet -- 1/28/2018 --   [facility] [place] [id number]    Sig: Hold Metformin until appetite improves to baseline    Class: Historical    metoprolol succinate ER (TOPROL XL) 50 MG 24 hr tablet  -- --  --       Sig: metoprolol succinate 50 mg tablet extended release 24 hr    Class: Historical    potassium chloride ER (MICRO-K) 10 MEQ CR capsule  -- -- 3/11/2025 --       Class: Historical    predniSONE (DELTASONE) 20 MG tablet  8 tablet 0 3/27/2022 --   [id number] - [place], [place] - [address] AT [address]    Sig: Take two tablets (= 40mg) each day for 5 (five) days    Class: E-Prescribe    sevelamer carbonate (RENVELA) 800 MG tablet  -- -- 1/29/2025 --       Sig: Take 800 mg by mouth 3 times daily (with meals).    Class: Historical    Route: Oral    Vitamin D, Cholecalciferol, 25 MCG  "(1000 UT) CAPS  -- --  --       Sig: Take by mouth.    Class: Historical    Route: Oral            Exam:   Pulse:  [81] 81  BP: (134)/(73) 134/73  SpO2:  [100 %] 100 %  Appearance: in no apparent distress.   Skin: normal  Head and Neck: Normal, no rashes or jaundice  Respiratory: easy respirations, no audible wheezing.  Cardiovascular: RRR  Abdomen: rounded, No distention and No surgical scars   Extremities: femoral 2+/2+, Edema, none  Neuro: without deficit     Diagnostics:   No results found for this or any previous visit (from the past 4 weeks).  No results found for: \"CPRA\"   "

## 2025-04-02 NOTE — LETTER
4/2/2025      Fawad Wilkinson  [address] [place] [address] Rd  [address]  [address]  [place] [address]      Dear Colleague,    Thank you for referring your patient, Fawad Wilkinson, to the [facility]ANSPL[facility]. Please see a copy of my visit note below.    TRANSPLANT NEPHROLOGY RECIPIENT EVALUATION NOTE    Recommendations:  - cardiac risk assessment with coronary angiogram given 20 + years of diabetes.   - request outside 7/2024 CT a/p for surgery review.  -iliac US  - per surgery would benefit from weight loss if he were to proceed down the pancreas transplant route.     Assessment and Plan:  # Kidney Transplant Evaluation: Patient is a good candidate overall. Benefits of a living donor transplant were discussed.    # ESKD from presumed type 2 diabetes: doing OK on dialysis since 8/2024 (currently PD), but would likely benefit from a kidney transplant.      # DM Type 2: A1c 6.4% using 24 units of insulin daily.       # BMI 31 with central obesity: per surgery would benefit from weight loss if he were to proceed down the pancreas transplant route.     # Cardiac Risk: no known history of cardiac disease. Will need risk assessment with coronary angiogram given 20 + years of diabetes.     # Former Smoker: minimal smoking, quit 20 years ago.     # PAD Screening: can request outside 7/2024 CT a/p  that did note disease, for surgery review. Needs iliac US.     # Physical function: Stays active mountain biking in the summer, Screened not frail.     # Health Maintenance: Colonoscopy: Not up to date and Dental: should be UTD in the past 6-12 months.     - Discussed the risks and benefits of a transplant, including the risk of surgery and immunosuppression medications.  Patient's overall evaluation will be discussed in the Transplant Program's regular meeting with a final recommendation on the patients suitability for transplant to be made at that time.    Pending completion of the full evaluation, patient presently  appears to be enough of an acceptable kidney transplant recipient candidate to have any potential kidney donors start the evaluation process.      Evaluation:  Fawad Wilkinson was seen in consultation at the request of Dr. Radhika Weinstein for evaluation as a potential kidney/pancreas transplant recipient.    Reason for Visit:  Fawad Wilkinson is a 64 year old male with ESKD from diabetes mellitus type 2, who presents for kidney/pancreas transplant evaluation.    History of Present Illness:            Kidney Disease Hx:        Fawad Wilkinson is a 64-year-old male with history of ESKD from presumed type 2 diabetes. HTN and diabetes since age 41. History of mild proteinuria noted in 2022.  Historical creatinines included: 1.33 mg/dL in December 2021  ->1.69 mg/dL in October 2022 -> 3.16 mg/dL June 2023. Ultimately started HD in August 2024  and switched to PD  about 2 months later which is going well.            Kidney Disease Dx: Diabetes mellitus type 2       Biopsy Proven: No         On Dialysis: Yes, Date initiated:8/2024 and Dialysis Type: PD, no episodes of peritonitis,  states BPs controlled        Primary Nephrologist: Dr. 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 and Peripheral neuropathy          Cardiac/Vascular Disease Risk Factors:        Cardiac Risk Factors: Diabetes, Hypertension, CKD, Smoking, and Age (Male > 55, Female > 65)       Known CAD: No       Known PAD/Caludication Symptoms: No       Known Heart Failure: No       Arrhythmia: No        Pulmonary Hypertension: No       Valvular Disease: No       Other: None         Viral Serology Status       CMV IgG Antibody: Unknown       EBV IgG Antibody: Unknown         Volume Status/Weight:        Volume status: Not assessed       Weight:  BMI within guidelines, but truncal obesity       BMI: There is no height or weight on file to calculate BMI.         Functional Capacity/Frailty:        Stays active mountain biking in the summer, Can walk 1 mile OK. No exertional symptoms.     Fatigue/Decreased Energy: [x] No [] Yes Improved with dialysis    Chest Pain or SOB with Exertion: [x] No [] Yes    Significant Weight Change: [x] No [] Yes    Nausea, Vomiting or Diarrhea: [x] No [] Yes    Fever, Sweats or Chills:  [x] No [] Yes    Leg Swelling [x] No [] Yes        History of Cancer: None        Allergy Testing Questions:   Medication that caused a reaction None   Antibiotics used that didn't give an allergic reaction?  Patient doesn't know    COVID Vaccination Up To Date: Not asked    Potential Living Kidney Donors: No    Review of Systems:  A comprehensive review of systems was obtained and negative, except as noted in the HPI or PMH.    Past Medical History:   Medical record was reviewed and PMH was discussed with patient and noted below.  Past Medical History:   Diagnosis Date     Diabetes (H)      Hypertension        Past Social History:   No past surgical history on file.  Personal history of bleeding or anesthesia problems: No    Family History:  No family history on file.    Personal History:   Social History     Socioeconomic History     Marital status: Single     Spouse name: Not on file     Number of children: Not on file     Years of education: Not on file     Highest education level: Not on file   Occupational History     Not on file   Tobacco Use     Smoking status: Former     Average packs/day: 0.5 packs/day for 11.0 years (5.5 ttl pk-yrs)     Types: Cigarettes     Start date: 1/1/1980     Smokeless  tobacco: Never   Substance and Sexual Activity     Alcohol use: Not Currently     Comment: Last drink 07/29/2024     Drug use: No     Sexual activity: Not on file   Other Topics Concern     Not on file   Social History Narrative     Not on file     Social Drivers of Health     Financial Resource Strain: Low Risk  (7/29/2024)    [facility]    Financial Resource Strain      Difficulty of Paying Living Expenses: 3      Difficulty of Paying Living Expenses: Not on file   Food Insecurity: No Food Insecurity (7/29/2024)    Received from [facility]    Food Insecurity      Do you worry your food will run out before you are able to buy more?: 1   Transportation Needs: No Transportation Needs (7/29/2024)    Received from [facility]    Transportation Needs      Does lack of transportation keep you from medical appointments?: 1      Does lack of transportation keep you from work, meetings or getting things that you need?: 1   Physical Activity: Not on file   Stress: Not on file   Social Connections: Socially Integrated (7/29/2024)    Received from [facility]    Social Connections      Do you often feel lonely or isolated from those around you?: 0   Interpersonal Safety: Not on file   Housing Stability: Low Risk  (7/29/2024)    Received from [facility]    Housing Stability      What is your housing situation today?: 1       Allergies:  No Known Allergies    Medications:  Current Outpatient Medications   Medication Sig Dispense Refill     albuterol (PROAIR HFA/PROVENTIL HFA/VENTOLIN HFA) 108 (90 Base) MCG/ACT inhaler Inhale 2 puffs into the lungs every 6 hours as needed for shortness of breath / dyspnea or wheezing 8 g 0     AMLODIPINE BESYLATE PO Take 10 mg by mouth every evening        ASPIRIN EC PO Take 81 mg by mouth every evening        Atorvastatin Calcium (LIPITOR PO) Take 10 mg by mouth every evening       LISINOPRIL PO Take 40 mg by mouth every evening       metFORMIN (GLUCOPHAGE-XR) 500 MG 24 hr tablet Hold Metformin until appetite improves to baseline 30 tablet      predniSONE (DELTASONE) 20 MG tablet Take two tablets (= 40mg) each day for 5 (five) days 8 tablet 0     No current facility-administered medications for this visit.       Vitals:  There were no vitals taken for this visit.    Exam:  GENERAL APPEARANCE: alert and no distress  HENT: mouth without ulcers or lesions  RESP: lungs clear to auscultation - no rales, rhonchi or wheezes  CV: regular rhythm, normal rate, no rub, no murmur  EDEMA: no LE edema bilaterally  ABDOMEN: soft, nondistended, nontender, bowel sounds normal  MS: extremities normal - no gross deformities noted, no evidence of inflammation in joints, no muscle tenderness  SKIN: no rash    Results:   No results found for this or any previous visit (from the past 2 weeks).    60 minutes spent on the date of the encounter doing chart review, history and exam, documentation and further activities per the note.         Again, thank you for allowing me to participate in the care of your patient.        Sincerely,        MICHELLE Vivar CNP    Electronically signed

## 2025-04-03 LAB
C PEPTIDE SERPL-MCNC: 11.3 NG/ML (ref 0.9–6.9)
CMV IGG SERPL IA-ACNC: <0.2 U/ML
CMV IGG SERPL IA-ACNC: NORMAL
DRVVT SCREEN RATIO: 1.01
EBV VCA IGG SER IA-ACNC: >750 U/ML
EBV VCA IGG SER IA-ACNC: POSITIVE
FASTING STATUS PATIENT QL REPORTED: NO
GAMMA INTERFERON BACKGROUND BLD IA-ACNC: 0.02 IU/ML
LA PPP-IMP: NEGATIVE
LUPUS INTERPRETATION: NORMAL
M TB IFN-G BLD-IMP: NEGATIVE
M TB IFN-G CD4+ BCKGRND COR BLD-ACNC: 3.56 IU/ML
MITOGEN IGNF BCKGRD COR BLD-ACNC: 0 IU/ML
MITOGEN IGNF BCKGRD COR BLD-ACNC: 0.01 IU/ML
PTT RATIO: 1.08
QUANTIFERON MITOGEN: 3.58 IU/ML
QUANTIFERON NIL TUBE: 0.02 IU/ML
QUANTIFERON TB1 TUBE: 0.02 IU/ML
QUANTIFERON TB2 TUBE: 0.03
THROMBIN TIME: 17.1 SECONDS (ref 13–19)
VZV IGG SER QL IA: 30.1 S/CO
VZV IGG SER QL IA: POSITIVE

## 2025-04-04 LAB
CARDIOLIPIN IGG SER IA-ACNC: <2 GPL-U/ML
CARDIOLIPIN IGG SER IA-ACNC: NEGATIVE
CARDIOLIPIN IGM SER IA-ACNC: 2.7 MPL-U/ML
CARDIOLIPIN IGM SER IA-ACNC: NEGATIVE

## 2025-04-06 LAB
PROTOCOL CUTOFF: NORMAL
SA 1  COMMENTS: NORMAL
SA 1 CELL: NORMAL
SA 1 TEST METHOD: NORMAL
SA 2 CELL: NORMAL
SA 2 COMMENTS: NORMAL
SA 2 TEST METHOD: NORMAL
SA1 HI RISK ABY: NORMAL
SA1 MOD RISK ABY: NORMAL
SA2 HI RISK ABY: NORMAL
SA2 MOD RISK ABY: NORMAL
UNACCEPTABLE ANTIGENS: NORMAL
UNOS CPRA: 0

## 2025-04-07 LAB
A*LOCUS SEROLOGIC EQUIVALENT: 2
A*SEROLOGIC EQUIVALENT: 68
ABTEST METHOD: NORMAL
B*LOCUS SEROLOGIC EQUIVALENT: 60
B*SEROLOGIC EQUIVALENT: 57
BW-1: NORMAL
BW-2: NORMAL
C*LOCUS SEROLOGIC EQUIVALENT: 6
C*SEROLOGIC EQUIVALENT: 7
DQB1*LOCUS SEROLOGIC EQUIVALENT: 8
DQB1*SEROLOGIC EQUIVALENT: 9
DRB1*LOCUS SEROLOGIC EQUIVALENT: 4
DRB1*SEROLOGIC EQUIVALENT: 7
DRB4*LOCUS SEROLOGIC EQUIVALENT: 53
DRB4*SEROLOGIC EQUIVALENT: NORMAL
DRNGS COMMENTS: NORMAL
DRSSO TEST METHOD: NORMAL
HLA-A HIGH RES: NORMAL
HLA-A HIGH RES: NORMAL
HLA-B HIGH RES: NORMAL
HLA-B HIGH RES: NORMAL
HLA-CW HIGH RES: NORMAL
HLA-CW HIGH RES: NORMAL
HLA-DPA1 HIGH RES: NORMAL
HLA-DPB1 HIGH RES: NORMAL
HLA-DPB1 HIGH RES: NORMAL
HLA-DPB1 UNRESLVD ALLELES HIGH RES: NORMAL
HLA-DPB1 UNRESLVD ALLELES HIGH RES: NORMAL
HLA-DQA1 HIGH RES: NORMAL
HLA-DQA1 HIGH RES: NORMAL
HLA-DQB1 HIGH RES: NORMAL
HLA-DQB1 HIGH RES: NORMAL
HLA-DRB1 HIGH RES: NORMAL
HLA-DRB1 HIGH RES: NORMAL
HLA-DRB4 HIGH RES: NORMAL
HLA-DRB4 HIGH RES: NORMAL

## 2025-04-09 ENCOUNTER — COMMITTEE REVIEW (OUTPATIENT)
Dept: TRANSPLANT | Facility: CLINIC | Age: 65
End: 2025-04-09
Payer: COMMERCIAL

## 2025-04-09 PROBLEM — J10.1 INFLUENZA A: Status: RESOLVED | Noted: 2018-01-27 | Resolved: 2025-04-09

## 2025-04-10 ENCOUNTER — TELEPHONE (OUTPATIENT)
Dept: TRANSPLANT | Facility: CLINIC | Age: 65
End: 2025-04-10
Payer: COMMERCIAL

## 2025-04-10 DIAGNOSIS — E78.5 HYPERLIPIDEMIA: ICD-10-CM

## 2025-04-10 DIAGNOSIS — I10 ESSENTIAL HYPERTENSION: ICD-10-CM

## 2025-04-10 DIAGNOSIS — E11.9 DIABETES MELLITUS, TYPE 2 (H): ICD-10-CM

## 2025-04-10 DIAGNOSIS — Z01.818 ENCOUNTER FOR PRE-TRANSPLANT EVALUATION FOR KIDNEY AND PANCREAS TRANSPLANT: Primary | ICD-10-CM

## 2025-04-10 DIAGNOSIS — Z76.82 ORGAN TRANSPLANT CANDIDATE: ICD-10-CM

## 2025-04-10 NOTE — TELEPHONE ENCOUNTER
Called pt to discuss outcome of Selection Committee. We discussed that we are moving forward with his evaluation and as we go through with his work-up we will have more insight into whether he is appropriate for SPK vs. K alone. We discussed that he will need to complete the following items:    Class- emailing the videos   Cards w/ angio   Wt. Loss encouraged   Iliac US  Colonoscopy  RUQ US d/t LFTs: AST 76, ALT 78, plt 129     He will view the videos online and let me know when he is done. He will work with his PCP to arrange a colonoscopy. I will place orders for his other follow up testing. He had no further questions and was in good agreement w/ the plan.

## 2025-04-10 NOTE — COMMITTEE REVIEW
Kidney/Pancreas Committee Review Note     Evaluation Date: 4/2/2025  Committee Review Date: 4/9/2025    Organ being evaluated for: Kidney/Pancreas    Transplant Phase: Evaluation  Transplant Status: Active    Transplant Coordinator: Molly Valdes  Transplant Surgeon: Dr. Radhika Weinstein       Referring Physician: Dr. Danny Olson    Primary Diagnosis: Diabetes Mellitus - Type II  Secondary Diagnosis:     Committee Review Members:  Nephrology Fernanda Cornejo, NP, Pat Aiken MD   Nutrition Elba Smith, RD   Pharmacist Tracy Carter, [facility]    - Clinical Rosi Kumar, MSW, Duglas Hoover, MSW, Shyann Durbin, [facility]   Transplant CIARAN FERNANDEZ, RN, Sissy Morley, RN, Vandana Zeng, CRISTIAN, MICHELLE Cast CNP, Molly Valdes, RN, Ritu Arrieta, RN, Tavia Rosado, RN, Kathy Swift, RN, Guerda Murray, RN   Transplant Surgery Juan Francisco Villar MD       Transplant Eligibility: Insulin-dependent diabetes mellitus, Irreversible chronic kidney disease treated w/dialysis or expected need for dialysis    Committee Review Decision: Approved    Relative Contraindications: None    Absolute Contraindications: None     Committee Chair Juan Francisco Villar MD verbally attested to the committee's decision.    Committee Discussion Details: Reviewed pt's medical status and evaluation results to date with multidisciplinary committee.    Recommended the following evaluation items:    Cardiology: will need risk assessment w/ coronary angiogram  Imaging Recommended:  will get iliac US for vessel status, will also get RUQ US d/t elevated LFTs and reduced Plt count  Health Maintenance:  due for colonoscopy     Patient should have live donors register now to initiate donor evaluation: Yes    Committee determined that patient is a Good candidate for Kidney Pancreas    Listing plan: Will not list at this time as patient is on dialysis    A2B Candidate: No    Patient will be  called and summary letter will be sent.

## 2025-04-10 NOTE — LETTER
04/10/25        Fawad Sebas Wilkinson  [address] [place] [address]  I  [address]  [place] [address]        Dear Fawad,    It was a pleasure to see you recently for consideration of kidney and pancreas transplantation. Your pre-transplant evaluation results were reviewed at our Multidisciplinary Selection Committee on 4/9/25. The Committee has approved you to move forward with your evaluation and is requesting the following items are completed as part of your evaluation:    Cardiac risk assessment to include coronary angiogram - our schedulers will call you to arrange this visit    Iliac ultrasound - our schedulers will call you to arrange this test    Right Upper Quadrant ultrasound - our schedulers will call you to arrange this test    We encourage you to work on weight loss to improve surgical outcomes     Screening Colonoscopy - please work with your Primary Care Provider to arrange this test    Please view the online patient education modules - I will send them to you via email, please let me know once you have watched the full series        Once the above items are completed, your case will be reviewed for consideration of active listing status. For any questions, please contact the Transplant Office at [phone] or you may reach me at [phone].      Sincerely,  Molly Valdes RN, Pre-Kidney/Pancreas Transplant Coordinator   Solid Organ Transplant  [facility], [facility]    CC: Care Team

## 2025-04-14 ENCOUNTER — TELEPHONE (OUTPATIENT)
Dept: TRANSPLANT | Facility: CLINIC | Age: 65
End: 2025-04-14
Payer: COMMERCIAL

## 2025-04-14 ENCOUNTER — DOCUMENTATION ONLY (OUTPATIENT)
Dept: TRANSPLANT | Facility: CLINIC | Age: 65
End: 2025-04-14
Payer: COMMERCIAL

## 2025-04-14 NOTE — TELEPHONE ENCOUNTER
Pt called to let me know he watched the pt education videos (see separate encounter). We reviewed that content together - he had a good understanding of it. I sent him the donor registration link in the event of an interested donor. His imaging is later this week and then he will meet with his PCP in May to discuss a CGM, and colonoscopy. He also sees Cardiology in July. He had no further questions at this time.

## 2025-04-14 NOTE — PROGRESS NOTES
Kidney, Pancreas Transplant Evaluation - 4/2/2025  Fawad Wilkinson attended the pre-transplant patient education class today. The [facility] website pre-transplant modules were viewed; class participants were educated on using the site.     Content reviewed:  Living Donation and how to access that program  Paired exchange  Kidney Donor Profile Index (KDPI)  Waiting list issues (right to decline without penalty, high PHS risk donors, what to expect when called with an offer)  Hospital experience, length of stay, need to stay locally post-discharge (2-4 weeks)                        Post-surgery lifting and driving restrictions  Post-transplant routines, frequency of lab work and clinic visits  Need to stay locally post-discharge (2-4 weeks)  Role of Transplant Coordinator    Participants were informed of the benefits of transplant as well as potential risks such as infection, cancer, and death.  The need for total adherence with immunosuppression medications and following transplant regimens was stressed.  The overall evaluation/approval/listing process was reviewed.

## 2025-04-17 ENCOUNTER — ANCILLARY PROCEDURE (OUTPATIENT)
Dept: ULTRASOUND IMAGING | Facility: CLINIC | Age: 65
End: 2025-04-17
Attending: NURSE PRACTITIONER
Payer: COMMERCIAL

## 2025-04-17 DIAGNOSIS — Z76.82 ORGAN TRANSPLANT CANDIDATE: ICD-10-CM

## 2025-04-17 DIAGNOSIS — E78.5 HYPERLIPIDEMIA: ICD-10-CM

## 2025-04-17 DIAGNOSIS — Z01.818 ENCOUNTER FOR PRE-TRANSPLANT EVALUATION FOR KIDNEY AND PANCREAS TRANSPLANT: ICD-10-CM

## 2025-04-17 DIAGNOSIS — I10 ESSENTIAL HYPERTENSION: ICD-10-CM

## 2025-04-17 DIAGNOSIS — E11.9 DIABETES MELLITUS, TYPE 2 (H): ICD-10-CM

## 2025-04-17 PROCEDURE — 76705 ECHO EXAM OF ABDOMEN: CPT | Mod: GC | Performed by: STUDENT IN AN ORGANIZED HEALTH CARE EDUCATION/TRAINING PROGRAM

## 2025-04-17 PROCEDURE — 93978 VASCULAR STUDY: CPT | Performed by: RADIOLOGY

## 2025-04-22 ENCOUNTER — TELEPHONE (OUTPATIENT)
Dept: TRANSPLANT | Facility: CLINIC | Age: 65
End: 2025-04-22
Payer: COMMERCIAL

## 2025-04-22 ENCOUNTER — TEAM CONFERENCE (OUTPATIENT)
Dept: TRANSPLANT | Facility: CLINIC | Age: 65
End: 2025-04-22
Payer: COMMERCIAL

## 2025-04-22 DIAGNOSIS — N18.6 END STAGE RENAL DISEASE (H): ICD-10-CM

## 2025-04-22 DIAGNOSIS — Z76.82 ORGAN TRANSPLANT CANDIDATE: ICD-10-CM

## 2025-04-22 DIAGNOSIS — Z01.818 ENCOUNTER FOR PRE-TRANSPLANT EVALUATION FOR KIDNEY AND PANCREAS TRANSPLANT: Primary | ICD-10-CM

## 2025-04-22 DIAGNOSIS — Z87.891 HISTORY OF TOBACCO USE: ICD-10-CM

## 2025-04-22 DIAGNOSIS — I10 ESSENTIAL HYPERTENSION: ICD-10-CM

## 2025-04-22 NOTE — TELEPHONE ENCOUNTER
Called pt to touch base on pre-kidney transplant evaluation. I let him know our surgeon reviewed his imaging and his vasculature is adequate for transplant. We discussed that we will refer him to Hepatology to review his RUQ US. He had no further questions and was in good agreement w/ the plan.

## 2025-04-22 NOTE — TELEPHONE ENCOUNTER
Image Review Meeting    ATTENDEES: Dr. Orlando     IMAGES REVIEWED: iliac US 4/17/25 and RUQ US 4/17/25    DECISION: Vessels suitable for transplant based on iliac US    INCIDENTALS: YES:  RUQ US shows Mildly coarsened hepatic echotexture as can be seen with intrinsic  hepatic parenchymal disease. - pt will need further evaluation with Hepatology.

## 2025-04-28 ENCOUNTER — PATIENT OUTREACH (OUTPATIENT)
Dept: CARE COORDINATION | Facility: CLINIC | Age: 65
End: 2025-04-28
Payer: COMMERCIAL

## 2025-05-01 ENCOUNTER — TELEPHONE (OUTPATIENT)
Dept: TRANSPLANT | Facility: CLINIC | Age: 65
End: 2025-05-01
Payer: COMMERCIAL

## 2025-05-01 NOTE — TELEPHONE ENCOUNTER
Received VM from pt stating he has not heard from GI - I will reach out to ensure they are working to get him scheduled - left him a VM explaining that.

## 2025-05-05 ENCOUNTER — TELEPHONE (OUTPATIENT)
Dept: TRANSPLANT | Facility: CLINIC | Age: 65
End: 2025-05-05
Payer: COMMERCIAL

## 2025-05-05 NOTE — CONFIDENTIAL NOTE
DIAGNOSIS    Encounter for pre-transplant evaluation for kidney and pancreas transplant   End stage renal disease (H)   History of tobacco use   Organ transplant candidate   Essential hypertension      Appt Date:  05.08.2025   NOTES STATUS DETAILS   OFFICE NOTE from referring provider Internal 04.22.2025 Gary Leonard APRN CNP    MEDICATION LIST Internal    IMAGING     ULTRASOUND LIVER Internal 04.17.2025 US Abd Limited   LABS     BASIC METABOLIC PANEL Care Everywhere 088.03.2024    COMPLETE METABOLIC PANEL Care Everywhere 08.05.2024   COMPLETE BLOOD COUNT (CBC) Care Everywhere 08.05.2024    INTERNATIONAL NORMALIZED RATIO (INR) Internal 04.02.2025    HEPATITIS C ANTIBODY Internal 04.02.2025    HEPATITIS B SURFACE ANTIGEN Internal 04.02.2025   HEPATITIS B SURFACE ANTIBODY Internal 04.02.2025   HEPATITIS B CORE ANTIBODY Internal 04.02.2025

## 2025-05-05 NOTE — TELEPHONE ENCOUNTER
Called pt and provided phone number for Hepatology scheduling. He agreed to call to make an appt.

## 2025-05-08 ENCOUNTER — PRE VISIT (OUTPATIENT)
Dept: GASTROENTEROLOGY | Facility: CLINIC | Age: 65
End: 2025-05-08
Payer: COMMERCIAL

## 2025-05-14 ENCOUNTER — TELEPHONE (OUTPATIENT)
Dept: TRANSPLANT | Facility: CLINIC | Age: 65
End: 2025-05-14
Payer: COMMERCIAL

## 2025-05-14 NOTE — TELEPHONE ENCOUNTER
Pt left me a VM that he had a lapse in coverage and had to cancel some appts. He is going to resume once that is resolved. I returned his call and thanked him for the update - he will keep me posted.

## 2025-05-31 ENCOUNTER — HEALTH MAINTENANCE LETTER (OUTPATIENT)
Age: 65
End: 2025-05-31

## 2025-06-30 NOTE — PROGRESS NOTES
[facility]   Cardiology Service  History and Physical      Fawad Wilkinson MRN# [id number]   YOB: 1960 Age: 65 year old   ** This visit was conducted by telephone.     HPI:   Fawad Wilkinson is a 65 year old year old male with a medical history significant for ESRD 2/2 DM 2 (on HD), obesity, former smoker, HLD, HTN. He presents today for cardiovascular risk assessment as part of pre-kidney transplant evaluation.     He is a former smoker; quit 20 years ago. He does not use drugs, does not use alcohol. His activity level is active. He is still working and rides his bicycle usually daily for about 1.5 hours. He denies symptoms of chest pain, dizziness, lightheadedness, palpitations, shortness of breath, orthopnea, PND, or edema. He does not have family history of pre-mature heart disease.       Cardiovascular Risk Factor Profile:  coronary artery disease, hypertension , obesity, diabetes, male age >45, previous smoker, and sedentary lifestyle     Current Cardiovascular Symptoms:  - None    ACC HF Stage:  Stage B    NYHA Class:  Class I    Functional Capacity:  Performs 4 METS exercise without symptoms (e.g., light housework, stairs, 4 mph walk, 7 mph bike, slow step dance)        Past Medical History:   Diagnosis Date    Diabetes (H)     End stage renal disease (H)     Former smoker     HTN (hypertension)     Hypertension     Mixed hyperlipidemia     Obesity     Vitamin D deficiency        No past surgical history on file.    Current Outpatient Medications   Medication Sig Dispense Refill    albuterol (PROAIR HFA/PROVENTIL HFA/VENTOLIN HFA) 108 (90 Base) MCG/ACT inhaler Inhale 2 puffs into the lungs every 6 hours as needed for shortness of breath / dyspnea or wheezing 8 g 0    AMLODIPINE BESYLATE PO Take 10 mg by mouth every evening       ASPIRIN EC PO Take 81 mg by mouth every evening      Atorvastatin Calcium (LIPITOR PO) Take 10 mg by mouth every evening       B-D U/F insulin pen needle       furosemide (LASIX) 40 MG tablet furosemide 40 mg tablet      LANTUS SOLOSTAR 100 UNIT/ML soln Lantus Solostar U-100 Insulin 100 unit/mL (3 mL) insulin pen      LISINOPRIL PO Take 40 mg by mouth every evening      metFORMIN (GLUCOPHAGE-XR) 500 MG 24 hr tablet Hold Metformin until appetite improves to baseline 30 tablet     metoprolol succinate ER (TOPROL XL) 50 MG 24 hr tablet metoprolol succinate 50 mg tablet extended release 24 hr      potassium chloride ER (MICRO-K) 10 MEQ CR capsule       predniSONE (DELTASONE) 20 MG tablet Take two tablets (= 40mg) each day for 5 (five) days 8 tablet 0    sevelamer carbonate (RENVELA) 800 MG tablet Take 800 mg by mouth 3 times daily (with meals).      Vitamin D, Cholecalciferol, 25 MCG (1000 UT) CAPS Take by mouth.       No current facility-administered medications for this visit.       Family History   Problem Relation Age of Onset    Diabetes mellitus (H) Father     Heart disease Paternal Grandfather     Cancer No family hx of     Kidney Disease No family hx of        Social History     Tobacco Use    Smoking status: Former     Average packs/day: 0.5 packs/day for 11.0 years (5.5 ttl pk-yrs)     Types: Cigarettes     Start date: 1/1/1980    Smokeless tobacco: Never   Substance Use Topics    Alcohol use: Not Currently     Comment: Last drink 07/29/2024       No Known Allergies      Review Of Systems:   CONSTITUTIONAL: No report of fevers or chills  RESPIRATORY: No cough, wheezing, SOB, or hemoptysis  CARDIOVASCULAR: see HPI  MUSCULO-SKELETAL: No joint pain or swelling, no muscle pain  NEURO: No paresthesias, syncope, pre-syncope, lightheadness, dizziness or vertigo  ENDOCRINE: No temperature intolerance, no skin/hair changes  PSYCHIATRIC: No change in mood, feeling down/anxious, no change in sleep or appetite  GI: No melena or hematochezia, no change in bowel habits  : No hematuria or dysuria, no hesitancy, dribbling or  incontinence.  HEME: No easy bruising or bleeding, no history of anemia, no history of blood clots  SKIN: No rashes or sores, no unusual itching      Physical Examination:  ** No physical exam performed due to visit being virtual.       Laboratory:  Last Comprehensive Metabolic Panel:  Lab Results   Component Value Date     2025    POTASSIUM 3.3 (L) 2025    CHLORIDE 97 (L) 2025    CO2 29 2025    ANIONGAP 14 2025     (H) 2025    BUN 43.3 (H) 2025    CR 12.40 (H) 2025    GFRESTIMATED 4 (L) 2025    TARIQ 9.0 2025       Last CBC:  CBC RESULTS:   Recent Labs   Lab Test 25  1420   WBC 10.0   RBC 3.69*   HGB 11.9*   HCT 34.2*   MCV 93   MCH 32.2   MCHC 34.8   RDW 12.3   *       25 EK/2/25 Echocardiogram:  Global and regional left ventricular function is normal with an EF of 55-60%.  Global right ventricular function is normal. The right ventricle is normal  size.  No significant valvular abnormalities present.  IVC diameter <2.1 cm collapsing >50% with sniff suggests a normal RA pressure  of 3 mmHg.  There is no prior study for direct comparison.    Assessment and Plan:     # Coronary Artery Disease   # Hx of Hyperlipidemia   No prior history of CAD, however, moderate coronary calcifications detected on 10/30/24 chest CT. Most recent lipid panel from 24 with total 137, HDL 54, LDL 65. He is on statin therapy. He denies chest pain, dizziness, lightheadedness, shortness of breath, orthopnea, edema, or PND.  Risk factors for CAD include hypertension, obesity, diabetes, male age >45, previous smoker.  - Continue Aspirin 81mg daily   - Continue Lipitor 10mg q HS   - Ischemic evaluation with coronary CT angiogram  - If acceptable, follow up with cardiology in 18-24 months; sooner with symptoms    # Hypertension  Blood pressures on home average 120's/70's.   - Continue Toprol XL 50mg daily   - Monitor BP's at home. Keep a log of  readings and bring to follow up appointments  - BP goal < 130/80    # Diabetes Mellitus Type 2  Most recent A1c per chart review 6.9% on 4/16/24. Home regimen includes Lantus U-100.  Blood sugars at home average 120-160. He denies hyper/hypoglycemic unawareness.   - Educated on importance of diabetic control in preventing progression of heart and kidney disease  - Continue current regimen    # Obesity (BMI 31)   Reports weight has been  stable over the last year.   - Educated patient on the importance of healthy diet and exercise in reducing risk for heart disease   - Encouraged 150 minutes/week of moderate intensity exercise   - Encouraged healthy weight loss; discussed Mediterranean diet     # ESRD on HD   He is currently on dialysis. He admits to being compliant with all medications. Denies uremic symptoms; pruritus, nausea, vomiting.   - Additional labs / follow-up / testing per SOT team      CAD Risk Level:  High Risk: > 3 risk factors, OR diabetic kidney disease, OR DM > 10 years, known CAD or PAD, prior PCI or CABG, on dialysis 5 years or more       RCRI Score:   - High risk surgery (>5% cardiac complication risk) = 1 point   - Diabetes Mellitus (on Insulin) = 1 point   - Serum Creatinine >2.0 mg/dl = 1 point        I have reviewed and updated the patient's Past Medical History, Social History, Family History and Medication List.       MICHELLE Clements, CNP  [facility] Cardiology      Review of prior external note(s) from - care everywhere, SOT, nephrology  Review of the result(s) of each unique test - echocardiogram, EKG, CT  Ordering of each unique test  Prescription drug management- medication reconciliation     15 minutes spent by me on the date of the encounter doing chart review, review of outside records, review of test results, patient visit, and documentation       MICHELLE Chery CNP on 7/30/2025 at 1:31 PM      Video-Visit Details  Type of service:  Telephone Visit   Video Start Time:  1310  Video End Time:1:31 PM    Originating Location (pt. Location): Home  Distant Location (provider location):  Off-site  Platform used for Video Visit: Telephone

## 2025-07-03 ENCOUNTER — TELEPHONE (OUTPATIENT)
Dept: TRANSPLANT | Facility: CLINIC | Age: 65
End: 2025-07-03
Payer: COMMERCIAL

## 2025-07-03 NOTE — TELEPHONE ENCOUNTER
Please call patient;  He had a visit with his PCP today 07/03/2025, stated he has a couple more tests to do but, wanted to discuss with care team.

## 2025-07-03 NOTE — TELEPHONE ENCOUNTER
Returned call to Fawad but was only able to leave a voice message and asked him to return my call.

## 2025-07-12 ENCOUNTER — HEALTH MAINTENANCE LETTER (OUTPATIENT)
Age: 65
End: 2025-07-12

## 2025-07-29 NOTE — PATIENT INSTRUCTIONS
You were seen in the cardiology clinic by: Pippa Akhtar CNP    Plan:     Medication Changes:   - None today    Labs/Tests Needed:   - Please call the number below to schedule your coronary CT angiogram. This will look for blockages in your heart.     Follow Up Visit:   - If your coronary CT scan is within acceptable limits, follow up with cardiology in 18-24 months.     General Recommendations/Guidelines:  - Monitor your blood pressures at home. Please keep a log of the readings and bring to your future nephrology or cardiology appointments. Your blood pressure goal is < 130/80. It is important to achieve adequate blood pressure control before transplant.   - Focus on a low sodium diet. You should aim to consume <2,000mg of sodium daily.   - I encourage you to achieve 150 minutes/week of moderate intensity exercise if able. If this is not achievable right away, you can gradually work yourself up to this starting with low intensity exercises (i.e. walking or swimming) a couple days a week. No swimming if on peritoneal dialysis.**      Important Numbers:     Cardiology   Telephone Number     To schedule an appointment or leave a message for your care team:   [phone]      Press #1   To reach the billing department: [phone]      Press # 3     To obtain copies of your medical records: [phone]      Press # 4   To reach the on-call cardiologist for after hours or on weekends: [phone]     Option #4, and ask to speak to the      Cardiovascular Clinic:   [address] [place] [address]. [place], [place] [id number] [phone]      Thank you for trusting us with your health care needs!

## 2025-07-30 ENCOUNTER — VIRTUAL VISIT (OUTPATIENT)
Dept: TRANSPLANT | Facility: CLINIC | Age: 65
End: 2025-07-30
Attending: NURSE PRACTITIONER
Payer: COMMERCIAL

## 2025-07-30 DIAGNOSIS — E66.9 OBESITY (BMI 30-39.9): ICD-10-CM

## 2025-07-30 DIAGNOSIS — E78.2 MIXED HYPERLIPIDEMIA: ICD-10-CM

## 2025-07-30 DIAGNOSIS — I10 ESSENTIAL HYPERTENSION: ICD-10-CM

## 2025-07-30 DIAGNOSIS — I25.10 CORONARY ARTERY DISEASE INVOLVING NATIVE CORONARY ARTERY OF NATIVE HEART WITHOUT ANGINA PECTORIS: Primary | ICD-10-CM

## 2025-07-30 NOTE — LETTER
7/30/2025      Fawad Wilkinson  [facility] [facility]  [address]  [address]  [place] [address]      Dear Colleague,    Thank you for referring your patient, Fawad Wilkinson, to the [facility]. Please see a copy of my visit note below.          [facility]   Cardiology Service  History and Physical      Fawad Wilkinson MRN# [id number]   YOB: 1960 Age: 65 year old   ** This visit was conducted by telephone.     HPI:   Fawad Wilkinson is a 65 year old year old male with a medical history significant for ESRD 2/2 DM 2 (on HD), obesity, former smoker, HLD, HTN. He presents today for cardiovascular risk assessment as part of pre-kidney transplant evaluation.     He is a former smoker; quit 20 years ago. He does not use drugs, does not use alcohol. His activity level is active. He is still working and rides his bicycle usually daily for about 1.5 hours. He denies symptoms of chest pain, dizziness, lightheadedness, palpitations, shortness of breath, orthopnea, PND, or edema. He does not have family history of pre-mature heart disease.       Cardiovascular Risk Factor Profile:  coronary artery disease, hypertension , obesity, diabetes, male age >45, previous smoker, and sedentary lifestyle     Current Cardiovascular Symptoms:  - None    ACC HF Stage:  Stage B    NYHA Class:  Class I    Functional Capacity:  Performs 4 METS exercise without symptoms (e.g., light housework, stairs, 4 mph walk, 7 mph bike, slow step dance)        Past Medical History:   Diagnosis Date     Diabetes (H)      End stage renal disease (H)      Former smoker      HTN (hypertension)      Hypertension      Mixed hyperlipidemia      Obesity      Vitamin D deficiency        No past surgical history on file.    Current Outpatient Medications   Medication Sig Dispense Refill     albuterol (PROAIR HFA/PROVENTIL HFA/VENTOLIN HFA) 108 (90 Base) MCG/ACT inhaler Inhale 2 puffs into the  lungs every 6 hours as needed for shortness of breath / dyspnea or wheezing 8 g 0     AMLODIPINE BESYLATE PO Take 10 mg by mouth every evening        ASPIRIN EC PO Take 81 mg by mouth every evening       Atorvastatin Calcium (LIPITOR PO) Take 10 mg by mouth every evening       B-D U/F insulin pen needle        furosemide (LASIX) 40 MG tablet furosemide 40 mg tablet       LANTUS SOLOSTAR 100 UNIT/ML soln Lantus Solostar U-100 Insulin 100 unit/mL (3 mL) insulin pen       LISINOPRIL PO Take 40 mg by mouth every evening       metFORMIN (GLUCOPHAGE-XR) 500 MG 24 hr tablet Hold Metformin until appetite improves to baseline 30 tablet      metoprolol succinate ER (TOPROL XL) 50 MG 24 hr tablet metoprolol succinate 50 mg tablet extended release 24 hr       potassium chloride ER (MICRO-K) 10 MEQ CR capsule        predniSONE (DELTASONE) 20 MG tablet Take two tablets (= 40mg) each day for 5 (five) days 8 tablet 0     sevelamer carbonate (RENVELA) 800 MG tablet Take 800 mg by mouth 3 times daily (with meals).       Vitamin D, Cholecalciferol, 25 MCG (1000 UT) CAPS Take by mouth.       No current facility-administered medications for this visit.       Family History   Problem Relation Age of Onset     Diabetes mellitus (H) Father      Heart disease Paternal Grandfather      Cancer No family hx of      Kidney Disease No family hx of        Social History     Tobacco Use     Smoking status: Former     Average packs/day: 0.5 packs/day for 11.0 years (5.5 ttl pk-yrs)     Types: Cigarettes     Start date: 1/1/1980     Smokeless tobacco: Never   Substance Use Topics     Alcohol use: Not Currently     Comment: Last drink 07/29/2024       No Known Allergies      Review Of Systems:   CONSTITUTIONAL: No report of fevers or chills  RESPIRATORY: No cough, wheezing, SOB, or hemoptysis  CARDIOVASCULAR: see HPI  MUSCULO-SKELETAL: No joint pain or swelling, no muscle pain  NEURO: No paresthesias, syncope, pre-syncope, lightheadness, dizziness or  vertigo  ENDOCRINE: No temperature intolerance, no skin/hair changes  PSYCHIATRIC: No change in mood, feeling down/anxious, no change in sleep or appetite  GI: No melena or hematochezia, no change in bowel habits  : No hematuria or dysuria, no hesitancy, dribbling or incontinence.  HEME: No easy bruising or bleeding, no history of anemia, no history of blood clots  SKIN: No rashes or sores, no unusual itching      Physical Examination:  ** No physical exam performed due to visit being virtual.       Laboratory:  Last Comprehensive Metabolic Panel:  Lab Results   Component Value Date     2025    POTASSIUM 3.3 (L) 2025    CHLORIDE 97 (L) 2025    CO2 29 2025    ANIONGAP 14 2025     (H) 2025    BUN 43.3 (H) 2025    CR 12.40 (H) 2025    GFRESTIMATED 4 (L) 2025    TARIQ 9.0 2025       Last CBC:  CBC RESULTS:   Recent Labs   Lab Test 25  1420   WBC 10.0   RBC 3.69*   HGB 11.9*   HCT 34.2*   MCV 93   MCH 32.2   MCHC 34.8   RDW 12.3   *       25 EK/2/25 Echocardiogram:  Global and regional left ventricular function is normal with an EF of 55-60%.  Global right ventricular function is normal. The right ventricle is normal  size.  No significant valvular abnormalities present.  IVC diameter <2.1 cm collapsing >50% with sniff suggests a normal RA pressure  of 3 mmHg.  There is no prior study for direct comparison.    Assessment and Plan:     # Coronary Artery Disease   # Hx of Hyperlipidemia   No prior history of CAD, however, moderate coronary calcifications detected on 10/30/24 chest CT. Most recent lipid panel from 24 with total 137, HDL 54, LDL 65. He is on statin therapy. He denies chest pain, dizziness, lightheadedness, shortness of breath, orthopnea, edema, or PND.  Risk factors for CAD include hypertension, obesity, diabetes, male age >45, previous smoker.  - Continue Aspirin 81mg daily   - Continue Lipitor 10mg q HS    - Ischemic evaluation with coronary CT angiogram  - If acceptable, follow up with cardiology in 18-24 months; sooner with symptoms    # Hypertension  Blood pressures on home average 120's/70's.   - Continue Toprol XL 50mg daily   - Monitor BP's at home. Keep a log of readings and bring to follow up appointments  - BP goal < 130/80    # Diabetes Mellitus Type 2  Most recent A1c per chart review 6.9% on 4/16/24. Home regimen includes Lantus U-100.  Blood sugars at home average 120-160. He denies hyper/hypoglycemic unawareness.   - Educated on importance of diabetic control in preventing progression of heart and kidney disease  - Continue current regimen    # Obesity (BMI 31)   Reports weight has been  stable over the last year.   - Educated patient on the importance of healthy diet and exercise in reducing risk for heart disease   - Encouraged 150 minutes/week of moderate intensity exercise   - Encouraged healthy weight loss; discussed Mediterranean diet     # ESRD on HD   He is currently on dialysis. He admits to being compliant with all medications. Denies uremic symptoms; pruritus, nausea, vomiting.   - Additional labs / follow-up / testing per SOT team      CAD Risk Level:  High Risk: > 3 risk factors, OR diabetic kidney disease, OR DM > 10 years, known CAD or PAD, prior PCI or CABG, on dialysis 5 years or more       RCRI Score:   - High risk surgery (>5% cardiac complication risk) = 1 point   - Diabetes Mellitus (on Insulin) = 1 point   - Serum Creatinine >2.0 mg/dl = 1 point        I have reviewed and updated the patient's Past Medical History, Social History, Family History and Medication List.       Pippa Akhtar, APRN, CNP  [facility] Cardiology      Review of prior external note(s) from - care everywhere, SOT, nephrology  Review of the result(s) of each unique test - echocardiogram, EKG, CT  Ordering of each unique test  Prescription drug management- medication reconciliation     15 minutes spent by me on the  date of the encounter doing chart review, review of outside records, review of test results, patient visit, and documentation       MICHELLE Chery CNP on 7/30/2025 at 1:31 PM      Video-Visit Details  Type of service:  Telephone Visit   Video Start Time: 1310  Video End Time:1:31 PM    Originating Location (pt. Location): Home  Distant Location (provider location):  Off-site  Platform used for Video Visit: Telephone      Again, thank you for allowing me to participate in the care of your patient.        Sincerely,        MICHELLE Chery CNP    Electronically signed